# Patient Record
Sex: FEMALE | Race: WHITE | NOT HISPANIC OR LATINO | Employment: OTHER | URBAN - METROPOLITAN AREA
[De-identification: names, ages, dates, MRNs, and addresses within clinical notes are randomized per-mention and may not be internally consistent; named-entity substitution may affect disease eponyms.]

---

## 2017-08-28 ENCOUNTER — TRANSCRIBE ORDERS (OUTPATIENT)
Dept: ADMINISTRATIVE | Facility: HOSPITAL | Age: 77
End: 2017-08-28

## 2017-08-28 DIAGNOSIS — Z12.31 SCREENING MAMMOGRAM, ENCOUNTER FOR: Primary | ICD-10-CM

## 2017-09-29 ENCOUNTER — HOSPITAL ENCOUNTER (OUTPATIENT)
Dept: MAMMOGRAPHY | Facility: HOSPITAL | Age: 77
Discharge: HOME | End: 2017-09-29
Attending: INTERNAL MEDICINE | Admitting: INTERNAL MEDICINE

## 2017-09-29 DIAGNOSIS — Z12.31 SCREENING MAMMOGRAM, ENCOUNTER FOR: ICD-10-CM

## 2017-09-29 PROCEDURE — G0202 SCR MAMMO BI INCL CAD: HCPCS

## 2017-10-02 ENCOUNTER — OFFICE VISIT (OUTPATIENT)
Dept: SURGERY | Facility: CLINIC | Age: 77
End: 2017-10-02

## 2017-10-02 ENCOUNTER — TELEPHONE (OUTPATIENT)
Dept: SURGERY | Facility: CLINIC | Age: 77
End: 2017-10-02

## 2017-10-02 VITALS — DIASTOLIC BLOOD PRESSURE: 82 MMHG | SYSTOLIC BLOOD PRESSURE: 128 MMHG | HEIGHT: 64 IN

## 2017-10-02 DIAGNOSIS — Z09 FOLLOW UP: Primary | ICD-10-CM

## 2017-10-02 DIAGNOSIS — Z17.0 MALIGNANT NEOPLASM OF CENTRAL PORTION OF RIGHT BREAST IN FEMALE, ESTROGEN RECEPTOR POSITIVE (HCC): ICD-10-CM

## 2017-10-02 DIAGNOSIS — C50.111 MALIGNANT NEOPLASM OF CENTRAL PORTION OF RIGHT BREAST IN FEMALE, ESTROGEN RECEPTOR POSITIVE (HCC): ICD-10-CM

## 2017-10-02 PROCEDURE — 99213 OFFICE O/P EST LOW 20 MIN: CPT | Performed by: SURGERY

## 2017-10-02 NOTE — PROGRESS NOTES
PATIENT INFORMATION  Anuja Echols   - 1940    CHIEF COMPLAINT  Chief Complaint   Patient presents with   • Follow-up   F/u mammo, pt is w/o complaints  1 year/annual mammogram left breast  Stage IIB ([id number]) strongly ER +, weakly LA +, HER-2 negative, invasive ductal carcinoma of the right breast.                         * status post mastectomy with axillary lymph node dissection              * On Arimidex    HISTORY OF PRESENT ILLNESS  HPI    Anuja presents to the office today for her 1 year/annual visit.  She is unable to provide any history.  Records of the nursing home reviewed.  Per review of records and discussion with his nurse that have been no complaints of any left breast masses, skin changes, nipple discharge or axillary masses.  She did not follow-up with Dr. Nicole Montesinos.  I did discuss this with the nursing staff.  Medications, medical and surgical history reviewed.  Nursing home records reviewed.  Mammogram left breast reviewed  - matured study as patient wheelchair bound.  BI-RADS Category 2.    REVIEW OF SYSTEMS  Review of Systems  Unobtainable    ACTIVE PROBLEMS  Patient Active Problem List    Diagnosis   • Malignant neoplasm of central portion of right female breast [C50.111]     Overview Note:     Description: Right     • Dementia [F03.90]   • Seizure disorder [G40.909]         PAST MEDICAL HISTORY  Past Medical History:   Diagnosis Date   • Brain damage     Anoxic   • Breast cancer 2015    Stage II, [id number], invasive ductal, right, ER/LA positive, HER-2/denia negative.   • Dementia    • Disease of thyroid gland     Hypothyroid   • Seizures    • Wheelchair bound     Possibly secondary to polio as child.         SURGICAL HISTORY  Past Surgical History:   Procedure Laterality Date   • BREAST BIOPSY     • MASTECTOMY Right     Modified radical w/axilary dissection.         FAMILY HISTORY  Family History   Problem Relation Age of Onset   • Breast cancer Neg Hx          SOCIAL  HISTORY  Social History     Occupational History   •  Unemployed     Social History Main Topics   • Smoking status: Never Smoker   • Smokeless tobacco: Not on file   • Alcohol use No   • Drug use: Not on file   • Sexual activity: Not on file         CURRENT MEDICATIONS    Current Outpatient Prescriptions:   •  acetaminophen (TYLENOL) 500 MG tablet, Take 500 mg by mouth every 8 (eight) hours as needed for mild pain (1-3)., Disp: , Rfl:   •  anastrozole (ARIMIDEX) 1 MG chemo tablet, , Disp: , Rfl:   •  calcium-vitamin D (OYST-ITZEL/VITAMIN D) 500-200 MG-UNIT per tablet, Take 1 tablet by mouth 2 (two) times a day., Disp: , Rfl:   •  Dextromethorphan-Guaifenesin (ROBAFEN DM PO), Take 10 mL by mouth., Disp: , Rfl:   •  docusate (COLACE) 50 MG/5ML liquid, Take 50 mg by mouth 2 (two) times a day., Disp: , Rfl:   •  donepezil (ARICEPT) 10 MG tablet, Take  by mouth., Disp: , Rfl:   •  gabapentin (NEURONTIN) 300 MG capsule, Take  by mouth 2 (two) times a day., Disp: , Rfl:   •  ipratropium-albuterol (DUO-NEB) 0.5-2.5 mg/mL nebulizer, Ipratropium-Albuterol 0.5-2.5 (3) MG/3ML Inhalation Solution; Patient Sig: Ipratropium-Albuterol 0.5-2.5 (3) MG/3ML Inhalation Solution ; 90; 0; 16-Sep-2015; Active, Disp: , Rfl:   •  levETIRAcetam (KEPPRA) 100 MG/ML solution, Take  by mouth 2 (two) times a day., Disp: , Rfl:   •  levothyroxine (SYNTHROID, LEVOTHROID) 75 MCG tablet, Take  by mouth., Disp: , Rfl:   •  LORazepam (ATIVAN) 0.5 MG tablet, Take  by mouth., Disp: , Rfl:   •  LORazepam (ATIVAN) 2 MG/ML injection, LORazepam 2 MG/ML Injection Solution; Patient Sig: LORazepam 2 MG/ML Injection Solution ; 2; 0; 07-Oct-2015; Active, Disp: , Rfl:   •  MAGNESIUM HYDROXIDE PO, Take 30 mL by mouth every night., Disp: , Rfl:   •  Nutritional Supplements (PROMOD) liquid, Take 30 mL by mouth., Disp: , Rfl:   •  nystatin (MYCOSTATIN) [id number] UNIT/GM cream, Apply  topically., Disp: , Rfl:   •  oxyCODONE-acetaminophen (PERCOCET) 5-325 MG per tablet, Take  "1 tablet by mouth every 8 (eight) hours as needed., Disp: , Rfl:   •  oyster shell calcium 500 MG tablet tablet, Take  by mouth 2 (two) times a day., Disp: , Rfl:   •  Unable to find, 1 each as needed. Calazyme cream Apply to buttocks and ramón area as needed DX: skin disorder nos, Disp: , Rfl:   •  VIMPAT 200 MG tablet, 2 (two) times a day., Disp: , Rfl:     ALLERGIES  Lactulose; Megestrol; and Nitrofurantoin    VITALS  Vitals:    10/02/17 1115   BP: 128/82   Height: 64\" (162.6 cm)       LAST RESULTS   Hospital Outpatient Visit on 02/16/2016   Component Date Value Ref Range Status   • WBC 02/16/2016 9.23  4.80 - 10.80 K/Cumm Final   • RBC 02/16/2016 4.81  4.20 - 5.40 Million Final   • Hemoglobin 02/16/2016 14.7  12.0 - 16.0 g/dL Final   • Hematocrit 02/16/2016 45.3  37.0 - 47.0 % Final   • MCV 02/16/2016 94.2  81.0 - 99.0 fL Final   • MCH 02/16/2016 30.6  27.0 - 31.0 pg Final   • MCHC 02/16/2016 32.5  31.0 - 37.0 g/dL Final   • RDW-CV 02/16/2016 13.2  11.5 - 14.5 % Final   • Platelets 02/16/2016 300  140 - 500 K/Cumm Final   • MPV 02/16/2016 11.0* 7.4 - 10.4 fL Final   • Neutrophil Rel % 02/16/2016 51  45 - 70 % Final   • Lymphocyte Rel % 02/16/2016 33  20 - 45 % Final   • Monocyte Rel % 02/16/2016 13* 3 - 8 % Final   • Eosinophil Rel % 02/16/2016 2  0 - 4 % Final   • Basophil Rel % 02/16/2016 1  0 - 2 % Final   • Neutrophils Absolute 02/16/2016 4.67  1.50 - 8.30 K/Cumm Final   • Lymphocytes Absolute 02/16/2016 3.03  0.60 - 4.80 K/Cumm Final   • Monocytes Absolute 02/16/2016 1.19* 0.00 - 1.00 K/Cumm Final   • Eosinophils Absolute 02/16/2016 0.19  0.10 - 0.30 K/Cumm Final   • Basophils Absolute 02/16/2016 0.09  0.00 - 0.20 K/Cumm Final   • Immature Granulocyte Rel % 02/16/2016 0.1  0.0 - 0.6 % Final     Mammo Screening Modified Left With Cad    Result Date: 9/29/2017  Narrative: LEFT UNILATERAL ANNUAL SCREENING MAMMOGRAM, 09/29/2017:  HISTORY: Right mastectomy for breast cancer in 2015.  TECHNIQUE: Left unilateral " digital diagnostic mammogram images were obtained and reviewed with CAD. The examination is significantly limited by restricted positioning as the patient is confined to wheelchair, has limited range of shoulder motion and suffers involuntary tremors.  COMPARISON: *  Mammogram 09/27/2016.  FINDINGS: The breasts are heterogeneously dense, which may obscure small masses. Stable diffuse mildly nodular parenchymal pattern and scattered benign-appearing calcifications. No significant change when compared with prior images. No mammographic evidence of malignancy. Recommend repeat mammogram in one year.      Impression: 1. Benign left unilateral screening mammogram. 2. Recommend repeat mammogram in one year.  BI-RADS CATEGORY 2: Benign Findings.  Women over the age of 40 undergoing screening mammography are entered into a reminder system with target due date for the next mammogram.  This report was finalized on 9/29/2017 3:18 PM by Dr. Henri Dickson MD.        PHYSICAL EXAM  Physical Exam   Constitutional: She is oriented to person, place, and time.   Cardiovascular: Normal rate, regular rhythm and normal heart sounds.    Pulmonary/Chest: Breath sounds normal. Left breast exhibits no inverted nipple, no mass, no nipple discharge, no skin change and no tenderness.       Abdominal: Soft. Bowel sounds are normal.   Lymphadenopathy:     She has no cervical adenopathy.   Neurological: She is alert and oriented to person, place, and time.   Skin: Skin is warm and dry.   Nursing note and vitals reviewed.      ASSESSMENT  Stage II invasive ductal carcinoma right breast - ER/HI positive, HER-2 negative  Status post modified radical mastectomy  On adjuvant hormonal/antiestrogen therapy-Arimidex    Most recent mammogram BI-RADS Category 2      PLAN  Will need her 1 year/annual surveillance left breast mammogram-October 2018  Will follow-up in 1 year after mammogram.  Nursing home staff was advised to contact Dr. Nicole Montesinos's  office to set up her follow-up appointment with medical oncology.  Nursing home staff was advised to call the office sooner should she have worsening symptoms or go to the nearest emergency room.

## 2017-10-02 NOTE — TELEPHONE ENCOUNTER
Please contact nursing home staff and inform them that they need to make patient's follow-up visit with Dr. Nicole Montesinos.

## 2017-10-16 ENCOUNTER — OFFICE VISIT (OUTPATIENT)
Dept: ONCOLOGY | Facility: CLINIC | Age: 77
End: 2017-10-16
Attending: INTERNAL MEDICINE

## 2017-10-16 ENCOUNTER — LAB (OUTPATIENT)
Dept: LAB | Facility: HOSPITAL | Age: 77
End: 2017-10-16
Attending: INTERNAL MEDICINE

## 2017-10-16 VITALS
RESPIRATION RATE: 14 BRPM | DIASTOLIC BLOOD PRESSURE: 82 MMHG | OXYGEN SATURATION: 97 % | TEMPERATURE: 98.1 F | SYSTOLIC BLOOD PRESSURE: 148 MMHG | HEART RATE: 86 BPM

## 2017-10-16 DIAGNOSIS — C50.111 MALIGNANT NEOPLASM OF CENTRAL PORTION OF RIGHT BREAST IN FEMALE, ESTROGEN RECEPTOR POSITIVE (HCC): Primary | ICD-10-CM

## 2017-10-16 DIAGNOSIS — Z17.0 MALIGNANT NEOPLASM OF CENTRAL PORTION OF RIGHT BREAST IN FEMALE, ESTROGEN RECEPTOR POSITIVE (HCC): Primary | ICD-10-CM

## 2017-10-16 LAB
BASOPHILS # BLD AUTO: 0.08 10*3/MM3 (ref 0–0.1)
BASOPHILS NFR BLD AUTO: 0.9 % (ref 0–1.1)
DEPRECATED RDW RBC AUTO: 47.6 FL (ref 37–49)
EOSINOPHIL # BLD AUTO: 0.33 10*3/MM3 (ref 0–0.36)
EOSINOPHIL NFR BLD AUTO: 3.9 % (ref 1–5)
ERYTHROCYTE [DISTWIDTH] IN BLOOD BY AUTOMATED COUNT: 13.2 % (ref 11.7–14.5)
HCT VFR BLD AUTO: 44.9 % (ref 34–45)
HGB BLD-MCNC: 14.4 G/DL (ref 11.5–14.9)
IMM GRANULOCYTES # BLD: 0.06 10*3/MM3 (ref 0–0.03)
IMM GRANULOCYTES NFR BLD: 0.7 % (ref 0–0.5)
LYMPHOCYTES # BLD AUTO: 3.17 10*3/MM3 (ref 1–3.5)
LYMPHOCYTES NFR BLD AUTO: 37.2 % (ref 20–49)
MCH RBC QN AUTO: 31 PG (ref 27–33)
MCHC RBC AUTO-ENTMCNC: 32.1 G/DL (ref 32–35)
MCV RBC AUTO: 96.6 FL (ref 83–97)
MONOCYTES # BLD AUTO: 0.9 10*3/MM3 (ref 0.25–0.8)
MONOCYTES NFR BLD AUTO: 10.6 % (ref 4–12)
NEUTROPHILS # BLD AUTO: 3.99 10*3/MM3 (ref 1.5–7)
NEUTROPHILS NFR BLD AUTO: 46.7 % (ref 39–75)
NRBC BLD MANUAL-RTO: 0 /100 WBC (ref 0–0)
PLATELET # BLD AUTO: 245 10*3/MM3 (ref 150–375)
PMV BLD AUTO: 10.6 FL (ref 8.9–12.1)
RBC # BLD AUTO: 4.65 10*6/MM3 (ref 3.9–5)
WBC NRBC COR # BLD: 8.53 10*3/MM3 (ref 4–10)

## 2017-10-16 PROCEDURE — 36416 COLLJ CAPILLARY BLOOD SPEC: CPT | Performed by: INTERNAL MEDICINE

## 2017-10-16 PROCEDURE — 85025 COMPLETE CBC W/AUTO DIFF WBC: CPT | Performed by: INTERNAL MEDICINE

## 2017-10-16 PROCEDURE — 99213 OFFICE O/P EST LOW 20 MIN: CPT | Performed by: INTERNAL MEDICINE

## 2017-10-16 RX ORDER — CLOTRIMAZOLE AND BETAMETHASONE DIPROPIONATE 10; .64 MG/G; MG/G
CREAM TOPICAL
Status: ON HOLD | COMMUNITY
Start: 2017-10-13 | End: 2021-03-24

## 2017-10-16 NOTE — PROGRESS NOTES
Subjective:     Reason for follow up:   1. Stage II, T2 N1a M0 invasive ductal carcinoma of the right central breast, ER positive, GA positive, HER-2/denia negative   * Status post modified radical mastectomy with axillary dissection.    * Anastrozole initiated on 12/29/2015.      History of Present Ilness: Anuja Echols presents for follow-up of breast cancer. She remains on Arimidex and tolerates it well.  She had a mammogram performed in late September that was benign.  Her caretaker that with her denies any significant issues.    Past Medical   Past Medical History:   Diagnosis Date   • Brain damage     Anoxic   • Breast cancer 12/2015    Stage II, P1R1wG2, invasive ductal, right, ER/GA positive, HER-2/denia negative.   • Dementia    • Depression    • Disease of thyroid gland     Hypothyroid   • H/O Diaphragmatic hernia without obstruction or gangrene    • Mild intellectual disability    • Osteoporosis    • Seizures    • Wheelchair bound     Possibly secondary to polio as child.     Patient Active Problem List   Diagnosis   • Malignant neoplasm of central portion of right female breast   • Dementia   • Seizure disorder     Social History   Social History     Social History   • Marital status:      Spouse name: N/A   • Number of children: N/A   • Years of education: N/A     Occupational History   •  Unemployed     Social History Main Topics   • Smoking status: Never Smoker   • Smokeless tobacco: Never Used   • Alcohol use No   • Drug use: Not on file   • Sexual activity: Not on file     Other Topics Concern   • Not on file     Social History Narrative      Family History  Family History   Problem Relation Age of Onset   • Breast cancer Neg Hx      Allergies  Allergies   Allergen Reactions   • Lactulose    • Megestrol    • Nitrofurantoin        Medications: The current medication list was reviewed in the EMR.    Review of Systems  Unobtainable secondary to nonverbal status     Objective:     Vitals:     10/16/17 1518   BP: 148/82   Pulse: 86   Resp: 14   Temp: 98.1 °F (36.7 °C)   TempSrc: Oral   SpO2: 97%   Weight: Comment: pt unable to stand for a weight   Height: Comment: pt unable to stand for a height   PainSc: 0-No pain     Current Status 10/16/2017   ECOG score 4     GENERAL:elderly female nonverbal in wheelchair  SKIN:  Without rashes, purpura or petechiae.   HEAD:  Normocephalic.  EYES:  EOMs intact.  Conjunctivae normal.  EARS:  Hearing intact.  LYMPHATICS:  No cervical, supraclavicular, axillary adenopathy.  CHEST:  Lungs clear to percussion and auscultation. Good airflow. Right chest wall status post mastectomy without palpable masses or skin changes         Labs and Imaging  Lab Results   Component Value Date    WBC 8.53 10/16/2017    HGB 14.4 10/16/2017    HCT 44.9 10/16/2017    MCV 96.6 10/16/2017     10/16/2017       Breast imaging: mammogram:  IMPRESSION:  1. Benign left unilateral screening mammogram.  2. Recommend repeat mammogram in one year.      BI-RADS CATEGORY 2: Benign Findings.    Assessment/Plan     Assessment:   1. Stage IIB (Q7X5jU3) strongly ER +, weakly IA +, HER-2 negative, invasive ductal carcinoma of the right breast.    * status post mastectomy with axillary lymph node dissection    * The patient was felt not to be a candidate for adjuvant chemotherapy.    * Anastrozole 1 mg daily was initiated on 12/29/2015 and she tolerates this well. We have not tested a dexa because she's nonambulatory and it would be difficult to obtain.   2. Dementia with anoxic brain injury: The patient is essentially nonverbal.     Plan:     1. Continue anastrazole.  2. Annual mammogram due next October  3. Patient will continue calcium and vitamin D supplementation.      Follow-up in 12 months. I asked the patient to call for any questions, concerns, or new symptoms.

## 2018-08-23 ENCOUNTER — TRANSCRIBE ORDERS (OUTPATIENT)
Dept: ADMINISTRATIVE | Facility: HOSPITAL | Age: 78
End: 2018-08-23

## 2018-08-23 DIAGNOSIS — Z12.39 SCREENING BREAST EXAMINATION: Primary | ICD-10-CM

## 2018-10-02 ENCOUNTER — HOSPITAL ENCOUNTER (OUTPATIENT)
Dept: MAMMOGRAPHY | Facility: HOSPITAL | Age: 78
Discharge: HOME | End: 2018-10-02
Attending: INTERNAL MEDICINE | Admitting: INTERNAL MEDICINE

## 2018-10-02 DIAGNOSIS — Z12.39 SCREENING BREAST EXAMINATION: ICD-10-CM

## 2018-10-02 PROCEDURE — 77063 BREAST TOMOSYNTHESIS BI: CPT

## 2018-10-02 PROCEDURE — 77067 SCR MAMMO BI INCL CAD: CPT

## 2018-10-10 ENCOUNTER — OFFICE VISIT (OUTPATIENT)
Dept: SURGERY | Facility: CLINIC | Age: 78
End: 2018-10-10

## 2018-10-10 VITALS — SYSTOLIC BLOOD PRESSURE: 102 MMHG | DIASTOLIC BLOOD PRESSURE: 62 MMHG

## 2018-10-10 DIAGNOSIS — Z17.0 MALIGNANT NEOPLASM OF CENTRAL PORTION OF RIGHT BREAST IN FEMALE, ESTROGEN RECEPTOR POSITIVE (HCC): Primary | ICD-10-CM

## 2018-10-10 DIAGNOSIS — C50.111 MALIGNANT NEOPLASM OF CENTRAL PORTION OF RIGHT BREAST IN FEMALE, ESTROGEN RECEPTOR POSITIVE (HCC): Primary | ICD-10-CM

## 2018-10-10 PROCEDURE — 99212 OFFICE O/P EST SF 10 MIN: CPT | Performed by: SURGERY

## 2018-10-10 NOTE — PROGRESS NOTES
1 yr f/u breast CA, R mastectomy 2015, mammo done, no complaints  She is status post right mastectomy in 2015.  She responds to questions when asked.  He is confined to a wheelchair.  She had a recent mammogram.  Her lungs are clear and equal or heart shows regular rate and rhythm her liver was nonpalpable or abdominal exam was benign she has small reducible asymptomatic umbilical hernia.  Right chest wall showed no masses her left breast showed no masses or skin changes.  There was no supraclavicular nor axillary adenopathy.  Her unilateral mammogram was benign with no malignant findings.  I would recommend observation only on her umbilical hernia and a repeat mammogram in one year with reexamine at that time.

## 2019-03-14 ENCOUNTER — TRANSCRIBE ORDERS (OUTPATIENT)
Dept: FAMILY MEDICINE CLINIC | Facility: CLINIC | Age: 79
End: 2019-03-14

## 2019-03-14 ENCOUNTER — HOSPITAL ENCOUNTER (EMERGENCY)
Facility: HOSPITAL | Age: 79
Discharge: HOME | End: 2019-03-14
Attending: EMERGENCY MEDICINE | Admitting: EMERGENCY MEDICINE

## 2019-03-14 ENCOUNTER — APPOINTMENT (OUTPATIENT)
Dept: GENERAL RADIOLOGY | Facility: HOSPITAL | Age: 79
End: 2019-03-14

## 2019-03-14 VITALS
TEMPERATURE: 98.5 F | HEART RATE: 73 BPM | DIASTOLIC BLOOD PRESSURE: 47 MMHG | SYSTOLIC BLOOD PRESSURE: 114 MMHG | OXYGEN SATURATION: 93 % | RESPIRATION RATE: 28 BRPM | WEIGHT: 140 LBS | BODY MASS INDEX: 23.9 KG/M2 | HEIGHT: 64 IN

## 2019-03-14 DIAGNOSIS — R13.10 DYSPHAGIA, UNSPECIFIED TYPE: ICD-10-CM

## 2019-03-14 DIAGNOSIS — J18.9 PNEUMONIA OF BOTH LOWER LOBES DUE TO INFECTIOUS ORGANISM: Primary | ICD-10-CM

## 2019-03-14 DIAGNOSIS — R05.3 CHRONIC COUGH: Primary | ICD-10-CM

## 2019-03-14 LAB
ALBUMIN SERPL-MCNC: 2.4 G/DL (ref 3.5–5.2)
ALBUMIN/GLOB SERPL: 0.4 G/DL
ALP SERPL-CCNC: 77 U/L (ref 39–117)
ALT SERPL W P-5'-P-CCNC: 10 U/L (ref 1–33)
ANION GAP SERPL CALCULATED.3IONS-SCNC: 10.3 MMOL/L
AST SERPL-CCNC: 14 U/L (ref 1–32)
BASOPHILS # BLD AUTO: 0.05 10*3/MM3 (ref 0–0.2)
BASOPHILS NFR BLD AUTO: 0.5 % (ref 0–1.5)
BILIRUB SERPL-MCNC: 0.5 MG/DL (ref 0.1–1.2)
BUN BLD-MCNC: 12 MG/DL (ref 8–23)
BUN/CREAT SERPL: 17.4 (ref 7–25)
CALCIUM SPEC-SCNC: 8.8 MG/DL (ref 8.6–10.5)
CHLORIDE SERPL-SCNC: 99 MMOL/L (ref 98–107)
CO2 SERPL-SCNC: 27.7 MMOL/L (ref 22–29)
CREAT BLD-MCNC: 0.69 MG/DL (ref 0.57–1)
DEPRECATED RDW RBC AUTO: 50 FL (ref 37–54)
EOSINOPHIL # BLD AUTO: 0.1 10*3/MM3 (ref 0–0.4)
EOSINOPHIL NFR BLD AUTO: 1 % (ref 0.3–6.2)
ERYTHROCYTE [DISTWIDTH] IN BLOOD BY AUTOMATED COUNT: 13.7 % (ref 12.3–15.4)
GFR SERPL CREATININE-BSD FRML MDRD: 82 ML/MIN/1.73
GLOBULIN UR ELPH-MCNC: 5.4 GM/DL
GLUCOSE BLD-MCNC: 139 MG/DL (ref 65–99)
HCT VFR BLD AUTO: 40.8 % (ref 34–46.6)
HGB BLD-MCNC: 12.7 G/DL (ref 12–15.9)
IMM GRANULOCYTES # BLD AUTO: 0.12 10*3/MM3 (ref 0–0.05)
IMM GRANULOCYTES NFR BLD AUTO: 1.2 % (ref 0–0.5)
LYMPHOCYTES # BLD AUTO: 2.05 10*3/MM3 (ref 0.7–3.1)
LYMPHOCYTES NFR BLD AUTO: 20.6 % (ref 19.6–45.3)
MCH RBC QN AUTO: 30.9 PG (ref 26.6–33)
MCHC RBC AUTO-ENTMCNC: 31.1 G/DL (ref 31.5–35.7)
MCV RBC AUTO: 99.3 FL (ref 79–97)
MONOCYTES # BLD AUTO: 0.64 10*3/MM3 (ref 0.1–0.9)
MONOCYTES NFR BLD AUTO: 6.4 % (ref 5–12)
NEUTROPHILS # BLD AUTO: 6.97 10*3/MM3 (ref 1.4–7)
NEUTROPHILS NFR BLD AUTO: 70.3 % (ref 42.7–76)
NRBC BLD AUTO-RTO: 0 /100 WBC (ref 0–0)
NT-PROBNP SERPL-MCNC: 299.3 PG/ML (ref 0–1800)
PLATELET # BLD AUTO: 303 10*3/MM3 (ref 140–450)
PMV BLD AUTO: 10.6 FL (ref 6–12)
POTASSIUM BLD-SCNC: 4.6 MMOL/L (ref 3.5–5.2)
PROT SERPL-MCNC: 7.8 G/DL (ref 6–8.5)
RBC # BLD AUTO: 4.11 10*6/MM3 (ref 3.77–5.28)
SODIUM BLD-SCNC: 137 MMOL/L (ref 136–145)
TROPONIN T SERPL-MCNC: <0.01 NG/ML (ref 0–0.03)
WBC NRBC COR # BLD: 9.93 10*3/MM3 (ref 3.4–10.8)

## 2019-03-14 PROCEDURE — 80053 COMPREHEN METABOLIC PANEL: CPT | Performed by: EMERGENCY MEDICINE

## 2019-03-14 PROCEDURE — 85025 COMPLETE CBC W/AUTO DIFF WBC: CPT | Performed by: EMERGENCY MEDICINE

## 2019-03-14 PROCEDURE — 84484 ASSAY OF TROPONIN QUANT: CPT | Performed by: EMERGENCY MEDICINE

## 2019-03-14 PROCEDURE — 99283 EMERGENCY DEPT VISIT LOW MDM: CPT | Performed by: EMERGENCY MEDICINE

## 2019-03-14 PROCEDURE — 96365 THER/PROPH/DIAG IV INF INIT: CPT

## 2019-03-14 PROCEDURE — 94640 AIRWAY INHALATION TREATMENT: CPT

## 2019-03-14 PROCEDURE — 93010 ELECTROCARDIOGRAM REPORT: CPT | Performed by: INTERNAL MEDICINE

## 2019-03-14 PROCEDURE — 71045 X-RAY EXAM CHEST 1 VIEW: CPT

## 2019-03-14 PROCEDURE — 25010000002 CEFTRIAXONE SODIUM-DEXTROSE 1-3.74 GM-%(50ML) RECONSTITUTED SOLUTION: Performed by: EMERGENCY MEDICINE

## 2019-03-14 PROCEDURE — 94799 UNLISTED PULMONARY SVC/PX: CPT

## 2019-03-14 PROCEDURE — 93005 ELECTROCARDIOGRAM TRACING: CPT | Performed by: EMERGENCY MEDICINE

## 2019-03-14 PROCEDURE — 99284 EMERGENCY DEPT VISIT MOD MDM: CPT

## 2019-03-14 PROCEDURE — 83880 ASSAY OF NATRIURETIC PEPTIDE: CPT | Performed by: EMERGENCY MEDICINE

## 2019-03-14 RX ORDER — ALBUTEROL SULFATE 2.5 MG/3ML
2.5 SOLUTION RESPIRATORY (INHALATION)
Status: COMPLETED | OUTPATIENT
Start: 2019-03-14 | End: 2019-03-14

## 2019-03-14 RX ORDER — AZITHROMYCIN 250 MG/1
500 TABLET, FILM COATED ORAL ONCE
Status: COMPLETED | OUTPATIENT
Start: 2019-03-14 | End: 2019-03-14

## 2019-03-14 RX ORDER — AZITHROMYCIN 250 MG/1
TABLET, FILM COATED ORAL
Qty: 6 TABLET | Refills: 0 | Status: SHIPPED | OUTPATIENT
Start: 2019-03-15 | End: 2019-12-03

## 2019-03-14 RX ORDER — CEFTRIAXONE 1 G/50ML
1 INJECTION, SOLUTION INTRAVENOUS ONCE
Status: COMPLETED | OUTPATIENT
Start: 2019-03-14 | End: 2019-03-14

## 2019-03-14 RX ORDER — SODIUM CHLORIDE 0.9 % (FLUSH) 0.9 %
10 SYRINGE (ML) INJECTION AS NEEDED
Status: DISCONTINUED | OUTPATIENT
Start: 2019-03-14 | End: 2019-03-14 | Stop reason: HOSPADM

## 2019-03-14 RX ORDER — ACETAMINOPHEN 500 MG
1000 TABLET ORAL EVERY 8 HOURS PRN
Status: ON HOLD | COMMUNITY
End: 2021-03-24

## 2019-03-14 RX ADMIN — CEFTRIAXONE 1 G: 1 INJECTION, SOLUTION INTRAVENOUS at 14:44

## 2019-03-14 RX ADMIN — ALBUTEROL SULFATE 2.5 MG: 2.5 SOLUTION RESPIRATORY (INHALATION) at 12:30

## 2019-03-14 RX ADMIN — ALBUTEROL SULFATE 2.5 MG: 2.5 SOLUTION RESPIRATORY (INHALATION) at 11:44

## 2019-03-14 RX ADMIN — AZITHROMYCIN 500 MG: 250 TABLET, FILM COATED ORAL at 14:44

## 2019-03-14 NOTE — DISCHARGE INSTRUCTIONS
Medication as directed.  Recommend oxygen via nasal cannula as needed to keep sats above 90%.  Follow-up with PCP as above.  Return to ED for medical emergencies.

## 2019-03-14 NOTE — ED PROVIDER NOTES
Subjective   Anuja Echols is an 79 yo WF who presents secondary to shortness of breath.  Patient is a resident at a local nursing home.  Per nursing home report she has been evaluated for possible aspiration.  Patient's oxygen saturations dropped into the 70s today.  Patient obviously short of breath.  Sats in the 70s on room air on EMS arrival at nursing home.  EMS was called to the nursing home.  Patient was placed on oxygen in route.  She presents for evaluation.    Patient has a history of anoxic brain injury.  She provides no useful information.  She will occasionally respond with a one-word answer.  However she is not a reliable historian.        History provided by:  Nursing home and EMS personnel  History limited by: Anoxic brain injury.  Shortness of Breath   Severity:  Unable to specify  Onset quality:  Unable to specify  Timing:  Unable to specify  Progression:  Unable to specify  Context comment:  As described above.  Associated symptoms comment:  Patient cannot provide any details.  No associated symptoms per nursing home or EMS report      Review of Systems   Unable to perform ROS: Dementia   Respiratory: Positive for shortness of breath.        Past Medical History:   Diagnosis Date   • Brain damage     Anoxic   • Breast cancer (CMS/[facility]) 12/2015    Stage II, U4Y2xQ0, invasive ductal, right, ER/NV positive, HER-2/denia negative.   • Dementia    • Depression    • Disease of thyroid gland     Hypothyroid   • H/O Diaphragmatic hernia without obstruction or gangrene    • Mild intellectual disability    • Osteoporosis    • Seizures (CMS/[facility])    • Wheelchair bound     Possibly secondary to polio as child.       Allergies   Allergen Reactions   • Lactulose    • Megestrol    • Nitrofurantoin        Past Surgical History:   Procedure Laterality Date   • BREAST BIOPSY     • MASTECTOMY Right 2015    Modified radical w/axilary dissection.       Family History   Problem Relation Age of Onset   • Breast cancer Neg  Hx        Social History     Socioeconomic History   • Marital status:      Spouse name: Not on file   • Number of children: Not on file   • Years of education: Not on file   • Highest education level: Not on file   Occupational History     Employer: UNEMPLOYED   Tobacco Use   • Smoking status: Never Smoker   • Smokeless tobacco: Never Used   Substance and Sexual Activity   • Alcohol use: No   • Drug use: No           Objective   Physical Exam   Constitutional: She appears well-developed and well-nourished. No distress.   78-year-old white female laying in bed.  Patient appears in fair overall health.  Vital signs notable for respiratory rate of 32.  O2 sat of 96% on oxygen via nasal cannula.   HENT:   Head: Normocephalic and atraumatic.   Right Ear: External ear normal.   Left Ear: External ear normal.   Nose: Nose normal.   Mouth/Throat: Oropharynx is clear and moist.   Eyes: Conjunctivae and EOM are normal. Pupils are equal, round, and reactive to light.   Neck: Normal range of motion. Neck supple.   Cardiovascular: Normal rate, regular rhythm, normal heart sounds and intact distal pulses. Exam reveals no gallop and no friction rub.   No murmur heard.  Pulmonary/Chest: Tachypnea noted. She is in respiratory distress. She has no wheezes. She has rhonchi in the right lower field and the left lower field.   Upper airway congestion present.  Coarse breath sounds and rhonchi bilaterally   Abdominal: Soft. Bowel sounds are normal. She exhibits no distension. There is no tenderness.   Musculoskeletal: Normal range of motion.   Neurological: She is alert. She has normal reflexes.   Skin: Skin is warm and dry. She is not diaphoretic.   Psychiatric: She has a normal mood and affect. Her behavior is normal.   Nursing note and vitals reviewed.      ECG 12 Lead    Date/Time: 3/14/2019 11:43 AM  Performed by: Gm Alcazar MD  Authorized by: Gm Alcazar MD   Interpreted by physician  Comparison: not compared  with previous ECG   Rhythm: sinus rhythm  Rate: normal  QRS axis: normal  Conduction: conduction normal  ST Segments: ST segments normal  T Waves: T waves normal  Q waves: III  Clinical impression: normal ECG                 ED Course  ED Course as of Mar 15 0902   Thu Mar 14, 2019   1135 Patient does not provide any history.  Her PCP as scheduled her for a swallow study secondary to concerns over possible aspiration.  Patient has rhonchi bilaterally as well as upper airway congestion.  Will give breathing treatments.  Will obtain lab work, EKG and chest x-ray.  [SS]   1412 CBC unremarkable.  CMP only notable for blood sugar 139.  Troponin and proBNP are negative.  Chest x-ray shows evidence of retrocardial consolidation as well as bilateral basilar infiltrate.  I will treat with antibiotics.  Patient's aspiratory status improved with mini neb treatments.  Will treat with antibiotics.  Hospitalization is not required at this time.  [SS]      ED Course User Index  [SS] Gm Alcazar MD      Labs Reviewed   COMPREHENSIVE METABOLIC PANEL - Abnormal; Notable for the following components:       Result Value    Glucose 139 (*)     Albumin 2.40 (*)     All other components within normal limits    Narrative:     GFR Normal >60  Chronic Kidney Disease <60  Kidney Failure <15   CBC WITH AUTO DIFFERENTIAL - Abnormal; Notable for the following components:    MCV 99.3 (*)     MCHC 31.1 (*)     Immature Grans % 1.2 (*)     Immature Grans, Absolute 0.12 (*)     All other components within normal limits   BNP (IN-HOUSE) - Normal    Narrative:     Among patients with dyspnea, NT-proBNP is highly sensitive for the detection of acute congestive heart failure. In addition NT-proBNP of <300 pg/ml effectively rules out acute congestive heart failure with 99% negative predictive value.   TROPONIN (IN-HOUSE) - Normal    Narrative:     Troponin T Reference Range:  <= 0.03 ng/mL-   Negative for AMI  >0.03 ng/mL-     Abnormal for  myocardial necrosis.  Clinicians would have to utilize clinical acumen, EKG, Troponin and serial changes to determine if it is an Acute Myocardial Infarction or myocardial injury due to an underlying chronic condition.    CBC AND DIFFERENTIAL    Narrative:     The following orders were created for panel order CBC & Differential.  Procedure                               Abnormality         Status                     ---------                               -----------         ------                     CBC Auto Differential[[id number]]         Abnormal            Final result                 Please view results for these tests on the individual orders.       Xr Chest 1 View    Result Date: 3/14/2019  Narrative: FRONTAL CHEST 3/14/2019     HISTORY: Cough and shortness of air. Low O2 sats today  TECHNIQUE: Frontal chest was performed. COMPARISON 1/2/2013  FINDINGS: Status post right axillary surgery. Soft tissue artifact overlies the right lung apex. The patient is rotated to the right. Cardiac silhouette is stable. The aortic knob is ectatic but unchanged. Lung volumes are low with bronchovascular crowding. There is retrocardiac consolidation suspicious for pneumonia. Probable trace left effusion. Perihilar and lower lung zone opacities right greater than the left may reflect faint infiltrates or atelectasis. No pneumothorax. Degenerative change of the shoulders.      Impression: 1. Low lung volumes with retrocardiac consolidation suspicious for pneumonia. Probable trace to small left effusion. 2. Perihilar and lower lung zone opacities suggestive of atelectasis or infiltrates.  This report was finalized on 3/14/2019 12:18 PM by Dr. Scott Bentley MD.        My differential diagnosis for dyspnea includes but is not limited to:  Asthma, COPD, pneumonia, pulmonary embolus, acute respiratory distress syndrome, pneumothorax, pleural effusion, pulmonary fibrosis, congestive heart failure, myocardial infarction, DKA, uremia,  acidosis, sepsis, anemia, drug related, hyperventilation, CNS disease              MDM  Number of Diagnoses or Management Options  Pneumonia of both lower lobes due to infectious organism (CMS/HCC): new and requires workup     Amount and/or Complexity of Data Reviewed  Clinical lab tests: reviewed and ordered  Tests in the radiology section of CPT®: reviewed and ordered  Tests in the medicine section of CPT®: ordered and reviewed    Risk of Complications, Morbidity, and/or Mortality  Presenting problems: moderate  Diagnostic procedures: high  Management options: moderate    Patient Progress  Patient progress: stable        Final diagnoses:   Pneumonia of both lower lobes due to infectious organism (CMS/HCC)            Gm Alcazar MD  03/15/19 0902

## 2019-03-14 NOTE — ED NOTES
Patients meds given in apple sauce. Patient tolerated well.   Ruthie Munson RN  03/14/19 5714       Ruthie Munson RN  03/14/19 [id number]

## 2019-03-22 ENCOUNTER — APPOINTMENT (OUTPATIENT)
Dept: GENERAL RADIOLOGY | Facility: HOSPITAL | Age: 79
End: 2019-03-22

## 2019-03-25 ENCOUNTER — HOSPITAL ENCOUNTER (OUTPATIENT)
Dept: GENERAL RADIOLOGY | Facility: HOSPITAL | Age: 79
Discharge: HOME | End: 2019-03-25
Admitting: INTERNAL MEDICINE

## 2019-03-25 DIAGNOSIS — R13.10 DYSPHAGIA, UNSPECIFIED TYPE: ICD-10-CM

## 2019-03-25 DIAGNOSIS — R05.3 CHRONIC COUGH: ICD-10-CM

## 2019-03-25 PROCEDURE — 92611 MOTION FLUOROSCOPY/SWALLOW: CPT

## 2019-03-25 PROCEDURE — 74230 X-RAY XM SWLNG FUNCJ C+: CPT

## 2019-03-25 NOTE — MBS/VFSS/FEES
Outpatient Speech Language Pathology   Adult Swallow Modified Barium Swallow Study  LISA Richardson     Patient Name: Anuja Echols  : 1940  MRN: [id number]  Today's Date: 3/25/2019         Visit Date: 2019   Patient Active Problem List   Diagnosis   • Malignant neoplasm of central portion of right female breast (CMS/HCC)   • Dementia   • Seizures (CMS/HCC)   • Anoxic brain injury (CMS/HCC)        Past Medical History:   Diagnosis Date   • Brain damage     Anoxic   • Breast cancer (CMS/HCC) 2015    Stage II, H2R3jH1, invasive ductal, right, ER/CA positive, HER-2/denia negative.   • Dementia    • Depression    • Disease of thyroid gland     Hypothyroid   • H/O Diaphragmatic hernia without obstruction or gangrene    • Mild intellectual disability    • Osteoporosis    • Seizures (CMS/HCC)    • Wheelchair bound     Possibly secondary to polio as child.        Past Surgical History:   Procedure Laterality Date   • BREAST BIOPSY     • MASTECTOMY Right     Modified radical w/axilary dissection.         Visit Dx:     ICD-10-CM ICD-9-CM   1. Chronic cough R05 786.2   2. Dysphagia, unspecified type R13.10 787.20           SLP Adult Swallow Evaluation - 19 1128        Rehab Evaluation    Document Type  evaluation   -AD    Total Evaluation Minutes, SLP  40   -AD    Subjective Information  no complaints   -AD    Patient Observations  alert;cooperative;agree to therapy   -AD    Patient/Family Observations  Pt seen upright in SONJA at 90 degrees.    -AD    Patient Effort  good   -AD    Symptoms Noted During/After Treatment  none   -AD       General Information    Patient Profile Reviewed  yes   -AD    Pertinent History Of Current Problem  Pt is an 77 y/o resident of [facility]. MBS ordered due to suspected aspiration, dysphagia and chronic cough. Staff member reports she has had increasing difficulty swallowing with concerns for aspiration and considering Gtube for nutrition vs  allowing pt to eat with known risks. Currently on a puree diet with honey thick liquids. Hx of anoxic brain injury.   -AD    Current Method of Nutrition  pureed;honey-thick liquids   -AD    Precautions/Limitations, Vision  WFL;for purposes of eval   -AD    Precautions/Limitations, Hearing  WFL;for purposes of eval   -AD    Prior Level of Function-Communication  cognitive-linguistic impairment;expressive language impairment   -AD    Prior Level of Function-Swallowing  puree;honey thick liquids   -AD    Plans/Goals Discussed with  patient;other (see comments) staff member from [facility]   -AD    Barriers to Rehab  previous functional deficit;cognitive status   -AD    Patient's Goals for Discharge  patient did not state   -AD    Family Goals for Discharge  other (see comments);comfort diet vs feeding tube   -AD       Pain Assessment    Additional Documentation  -- no pain indicated   -AD       Oral Motor and Function    Oral Lesions or Structural Abnormalities and/or variants  none noted   -AD    Dentition Assessment  missing teeth;teeth are in poor condition   -AD    Secretion Management  other (see comments) excess secretions, able to spit some out   -AD    Mucosal Quality  moist, healthy   -AD    Volitional Swallow  delayed   -AD    Volitional Cough  unable to elicit;other (see comments) strong involuntary cough   -AD       Oral Musculature and Cranial Nerve Assessment    Oral Motor General Assessment  generalized oral motor weakness   -AD    Vocal Impairment, Detail. Cranial Nerve X (Vagus)  other (see comments) unable to assess; limited verbal/vocalizations   -AD       General Eating/Swallowing Observations    Respiratory Support Currently in Use  nasal cannula   -AD    Eating/Swallowing Skills  fed by SLP   -AD    Positioning During Eating  upright 90 degree;upright in chair   -AD       MBS/VFSS    Utensils Used  spoon;straw   -AD    Consistencies Trialed  regular textures;pureed;thin  liquids;nectar/syrup-thick liquids;honey-thick liquids pt unable to bite solids; not attempted   -AD       MBS/VFSS Interpretation    Oral Prep Phase  impaired oral phase of swallowing   -AD    Oral Transit Phase  impaired   -AD    Oral Residue  WFL   -AD    VFSS Summary  Pt presents with a moderate oropharyngeal dysphagia with decreased oral control and resulting in anterior loss, prolonged manipulation and premature spill. Pt with decreased hyolaryngeal movement, delay triggering of the pharyngeal swallow and decreased airway closure during the swallow w/penetration during and before on consecutive straw drinks from thins. No penetration of thins from spoon, nectar from spoon, honey thick and puree. Pt unable to bite the brittaney cracker for the exam therefore solids could not be assessed.    -AD    Oral Phase, Comment  Pt with decreased tongue strength and bolus prep causing increased transit time and premature spill. Weak back of tongue noted on all consistencies and trials.    -AD       Oral Preparatory Phase    Oral Preparatory Phase  reduced lip closure around utensil;prolonged manipulation;inadequate manipulation;anterior loss   -AD    Reduced Lip Closure Around Utensil  thin liquids;nectar-thick liquids;secondary to reduced labial seal;secondary to impaired cognitive status   -AD    Anterior Loss  thin liquids;nectar-thick liquids;secondary to reduced labial seal;secondary to impaired cognitive status   -AD    Prolonged Manipulation  nectar-thick liquids;honey-thick liquids;pudding/puree;secondary to reduced lingual strength   -AD    Inadequate Manipulation  thin liquids;nectar-thick liquids;honey-thick liquids;pudding/puree;secondary to reduced lingual strength   -AD    Oral Preparatory Phase, Comment  Pt with decreased tongue strength resulting in decreased bolus formation and control. Decreased closure around the spoon on thins and nectar resulting in anterior loss. Felt to be due to cognition in part due  to no issues with honey thick liquids or puree lip closure.    -AD       Oral Transit Phase    Impaired Oral Transit Phase  premature spillage of liquids into pharynx   -AD    Premature Spillage of Liquids into Pharynx  thin liquids;nectar-thick liquids;honey-thick liquids;secondary to reduced lingual control;other (see comments) decreased back of tongue movement   -AD    Oral Transit Phase, Comment  Pt with premature spill of all due to decreased tngue control and back of tongue weakness. Pt w/spill of honey to the rami of the mandible, nectar to the valleculae, thins to the valleculae and pyriforms.    -AD       Initiation of Pharyngeal Swallow    Initiation of Pharyngeal Swallow  bolus in valleculae;bolus in pyriform sinuses   -AD    Pharyngeal Phase  impaired pharyngeal phase of swallowing   -AD    Penetration Before the Swallow  thin liquids;secondary to reduced back of tongue control;secondary to delayed swallow initiation or mistiming   -AD    Penetration During the Swallow  thin liquids;secondary to delayed swallow initiation or mistiming;secondary to reduced laryngeal elevation;secondary to reduced vestibular closure   -AD    Response to Penetration  deep;no response;other (see comments) moderate depth above the vocal folds   -AD    Rosenbek's Scale  nectar:;honey:;pudding/puree:;1--->level 1;thin:;3--->level 3   -AD    Pharyngeal Residue  honey-thick liquids;valleculae;thin liquids;laryngeal vestibule;secondary to reduced hyolaryngeal excursion   -AD    Response to Residue  cleared residue with spontaneous subsequent swallow   -AD    Attempted Compensatory Maneuvers  bolus size   -AD    Response to Attempted Compensatory Maneuvers  prevented penetration   -AD    Pharyngeal Phase, Comment  Pt with noted delay of swallow resulting in premature spill and penetration of thins from straw. This is a result of decreased hyolaryngeal movement, decreased airway closure and delay in the triggering of the pharyngeal  swallow. Mild residue also noted on honey thick liquid trails w/spontaneous clearance. No aspiration was viewed and small spoon size bolus of thins prevented penetration.    -AD       Clinical Impression    SLP Swallowing Diagnosis  moderate;oral dysfunction;pharyngeal dysfunction   -AD    Functional Impact  risk of aspiration/pneumonia;risk of malnutrition   -AD    Rehab Potential/Prognosis, Swallowing  re-evaluate goals as necessary   -AD    Swallow Criteria for Skilled Therapeutic Interventions Met  demonstrates skilled criteria;other (see comments) if not already attempted and per [facility] staff SLP rec   -AD       Recommendations    Therapy Frequency (Swallow)  evaluation only   -AD    SLP Diet Recommendation  puree;honey thick liquids;other (see comments) consider trials of nectar if clinically indicated   -AD    Recommended Precautions and Strategies  upright posture during/after eating;small bites of food and sips of liquid;multiple swallows per sip of liquid   -AD    SLP Rec. for Method of Medication Administration  meds crushed;with pudding or applesauce;as tolerated   -AD    Monitor for Signs of Aspiration  no;none - silent aspiration present;other (see comments) silent penetration   -AD    Anticipated Dischage Disposition  extended care facility   -AD      User Key  (r) = Recorded By, (t) = Taken By, (c) = Cosigned By    Initials Name Provider Type    AD Jayla Herrera MS CCC-SLP Speech and Language Pathologist           OP SLP Education     Row Name 03/25/19 1128       Education    Barriers to Learning  Other (comment0 cognitive deficit  -AD    Action Taken to Address Barriers  Education provided to caregiver  -AD    Education Provided  Described results of evaluation;Family/caregivers expressed understanding of evaluation  -AD    Assessed  Learning needs;Learning motivation;Learning preferences;Learning readiness  -AD    Learning Motivation  Strong  -AD    Learning Method  Explanation  -AD     Teaching Response  Verbalized understanding  -AD      User Key  (r) = Recorded By, (t) = Taken By, (c) = Cosigned By    Initials Name Effective Dates    Jayla Jean Baptiste MS CCC-SLP 02/28/19 -           OP SLP Assessment/Plan - 03/25/19 1128        SLP Assessment    Functional Problems  Swallowing   -AD    Impact on Function: Swallowing  Risk of malnourishment;Risk of aspiration;Risk of pneumonia   -AD    Clinical Impression: Swallowing  Moderate:;oropharyngeal phase dysphagia   -AD    Functional Problems Comment  Decreased po intake and chronic, worsening cough   -AD    Clinical Impression Comments  Pt with recent pneumonia reported 3/14/19. Continues at present with congested with poor clearance of secretions.    -AD    SLP Diagnosis  Moderate oropharyngeal dysphagia   -AD    Prognosis  Fair (comment) due to prior deficit and cognitive status    due to prior deficit and cognitive status  -AD    Patient/caregiver participated in establishment of treatment plan and goals  Yes caregiver    caregiver  -AD       SLP Plan    Frequency  eval only   -AD    Planned CPT's?  SLP MBS: 76921   -AD      User Key  (r) = Recorded By, (t) = Taken By, (c) = Cosigned By    Initials Name Provider Type    Jayla Jean Baptiste MS CCC-SLP Speech and Language Pathologist             Time Calculation:   SLP Start Time: 1030  SLP Stop Time: 1128  SLP Time Calculation (min): 58 min  SLP Non-Billable Time (min): 18 min(transfer assistance)  Total Timed Code Minutes- SLP: 0 minute(s)    Therapy Charges for Today     Code Description Service Date Service Provider Modifiers Qty    [id number] HC ST MOTION FLUORO EVAL SWALLOW 3 3/25/2019 Jayla Herrera MS CCC-SLP GN 1          Jayla Herrera MS CCC-SLP  3/25/2019

## 2019-09-23 DIAGNOSIS — Z85.3 HX: BREAST CANCER: ICD-10-CM

## 2019-09-23 DIAGNOSIS — Z12.39 BREAST CANCER SCREENING, HIGH RISK PATIENT: Primary | ICD-10-CM

## 2019-10-03 ENCOUNTER — HOSPITAL ENCOUNTER (OUTPATIENT)
Dept: MAMMOGRAPHY | Facility: HOSPITAL | Age: 79
Discharge: HOME | End: 2019-10-03
Admitting: SURGERY

## 2019-10-03 DIAGNOSIS — Z85.3 HX: BREAST CANCER: ICD-10-CM

## 2019-10-03 DIAGNOSIS — Z12.39 BREAST CANCER SCREENING, HIGH RISK PATIENT: ICD-10-CM

## 2019-10-03 PROCEDURE — 77065 DX MAMMO INCL CAD UNI: CPT

## 2019-10-03 PROCEDURE — G0279 TOMOSYNTHESIS, MAMMO: HCPCS

## 2019-10-15 ENCOUNTER — OFFICE VISIT (OUTPATIENT)
Dept: SURGERY | Facility: CLINIC | Age: 79
End: 2019-10-15

## 2019-10-15 ENCOUNTER — TRANSCRIBE ORDERS (OUTPATIENT)
Dept: SURGERY | Facility: CLINIC | Age: 79
End: 2019-10-15

## 2019-10-15 VITALS
RESPIRATION RATE: 18 BRPM | DIASTOLIC BLOOD PRESSURE: 70 MMHG | SYSTOLIC BLOOD PRESSURE: 132 MMHG | BODY MASS INDEX: 23.9 KG/M2 | WEIGHT: 140 LBS | HEIGHT: 64 IN

## 2019-10-15 DIAGNOSIS — C50.111 MALIGNANT NEOPLASM OF CENTRAL PORTION OF RIGHT BREAST IN FEMALE, ESTROGEN RECEPTOR POSITIVE (HCC): Primary | ICD-10-CM

## 2019-10-15 DIAGNOSIS — Z17.0 MALIGNANT NEOPLASM OF CENTRAL PORTION OF RIGHT BREAST IN FEMALE, ESTROGEN RECEPTOR POSITIVE (HCC): Primary | ICD-10-CM

## 2019-10-15 DIAGNOSIS — Z12.31 SCREENING MAMMOGRAM FOR HIGH-RISK PATIENT: Primary | ICD-10-CM

## 2019-10-15 PROCEDURE — 99212 OFFICE O/P EST SF 10 MIN: CPT | Performed by: SURGERY

## 2019-12-03 ENCOUNTER — LAB (OUTPATIENT)
Dept: LAB | Facility: HOSPITAL | Age: 79
End: 2019-12-03

## 2019-12-03 ENCOUNTER — OFFICE VISIT (OUTPATIENT)
Dept: ONCOLOGY | Facility: CLINIC | Age: 79
End: 2019-12-03

## 2019-12-03 VITALS
BODY MASS INDEX: 24.2 KG/M2 | DIASTOLIC BLOOD PRESSURE: 80 MMHG | SYSTOLIC BLOOD PRESSURE: 148 MMHG | HEIGHT: 64 IN | HEART RATE: 75 BPM | RESPIRATION RATE: 12 BRPM | TEMPERATURE: 97.6 F | OXYGEN SATURATION: 98 %

## 2019-12-03 DIAGNOSIS — C50.111 MALIGNANT NEOPLASM OF CENTRAL PORTION OF RIGHT FEMALE BREAST, UNSPECIFIED ESTROGEN RECEPTOR STATUS (HCC): Primary | ICD-10-CM

## 2019-12-03 LAB
ALBUMIN SERPL-MCNC: 3.5 G/DL (ref 3.5–5.2)
ALBUMIN/GLOB SERPL: 0.8 G/DL
ALP SERPL-CCNC: 105 U/L (ref 39–117)
ALT SERPL W P-5'-P-CCNC: 14 U/L (ref 1–33)
ANION GAP SERPL CALCULATED.3IONS-SCNC: 15.6 MMOL/L (ref 5–15)
AST SERPL-CCNC: 15 U/L (ref 1–32)
BASOPHILS # BLD AUTO: 0.05 10*3/MM3 (ref 0–0.2)
BASOPHILS NFR BLD AUTO: 0.8 % (ref 0–1.5)
BILIRUB SERPL-MCNC: 0.2 MG/DL (ref 0.2–1.2)
BUN BLD-MCNC: 17 MG/DL (ref 8–23)
BUN/CREAT SERPL: 26.6 (ref 7–25)
CALCIUM SPEC-SCNC: 9.4 MG/DL (ref 8.6–10.5)
CHLORIDE SERPL-SCNC: 98 MMOL/L (ref 98–107)
CO2 SERPL-SCNC: 23.4 MMOL/L (ref 22–29)
CREAT BLD-MCNC: 0.64 MG/DL (ref 0.57–1)
DEPRECATED RDW RBC AUTO: 48.5 FL (ref 37–54)
EOSINOPHIL # BLD AUTO: 0.1 10*3/MM3 (ref 0–0.4)
EOSINOPHIL NFR BLD AUTO: 1.6 % (ref 0.3–6.2)
ERYTHROCYTE [DISTWIDTH] IN BLOOD BY AUTOMATED COUNT: 13.6 % (ref 12.3–15.4)
GFR SERPL CREATININE-BSD FRML MDRD: 89 ML/MIN/1.73
GLOBULIN UR ELPH-MCNC: 4.5 GM/DL
GLUCOSE BLD-MCNC: 206 MG/DL (ref 65–99)
HCT VFR BLD AUTO: 45.2 % (ref 34–46.6)
HGB BLD-MCNC: 14.5 G/DL (ref 12–15.9)
IMM GRANULOCYTES # BLD AUTO: 0.03 10*3/MM3 (ref 0–0.05)
IMM GRANULOCYTES NFR BLD AUTO: 0.5 % (ref 0–0.5)
LYMPHOCYTES # BLD AUTO: 1.59 10*3/MM3 (ref 0.7–3.1)
LYMPHOCYTES NFR BLD AUTO: 24.7 % (ref 19.6–45.3)
MCH RBC QN AUTO: 30.9 PG (ref 26.6–33)
MCHC RBC AUTO-ENTMCNC: 32.1 G/DL (ref 31.5–35.7)
MCV RBC AUTO: 96.4 FL (ref 79–97)
MONOCYTES # BLD AUTO: 0.67 10*3/MM3 (ref 0.1–0.9)
MONOCYTES NFR BLD AUTO: 10.4 % (ref 5–12)
NEUTROPHILS # BLD AUTO: 4.01 10*3/MM3 (ref 1.7–7)
NEUTROPHILS NFR BLD AUTO: 62 % (ref 42.7–76)
NRBC BLD AUTO-RTO: 0 /100 WBC (ref 0–0.2)
PLATELET # BLD AUTO: 288 10*3/MM3 (ref 140–450)
PMV BLD AUTO: 10.4 FL (ref 6–12)
POTASSIUM BLD-SCNC: 4.1 MMOL/L (ref 3.5–5.2)
PROT SERPL-MCNC: 8 G/DL (ref 6–8.5)
RBC # BLD AUTO: 4.69 10*6/MM3 (ref 3.77–5.28)
SODIUM BLD-SCNC: 137 MMOL/L (ref 136–145)
WBC NRBC COR # BLD: 6.45 10*3/MM3 (ref 3.4–10.8)

## 2019-12-03 PROCEDURE — 80053 COMPREHEN METABOLIC PANEL: CPT

## 2019-12-03 PROCEDURE — 85025 COMPLETE CBC W/AUTO DIFF WBC: CPT

## 2019-12-03 PROCEDURE — 99213 OFFICE O/P EST LOW 20 MIN: CPT | Performed by: INTERNAL MEDICINE

## 2019-12-03 PROCEDURE — 36415 COLL VENOUS BLD VENIPUNCTURE: CPT

## 2019-12-03 NOTE — PROGRESS NOTES
Subjective:     Reason for follow up:   1. Stage II, T2 N1a M0 invasive ductal carcinoma of the right central breast, ER positive, SC positive, HER-2/denia negative   * Status post modified radical mastectomy with axillary dissection.    * Anastrozole initiated on 12/29/2015.      History of Present Ilness: Anuja Echols presents for follow-up of breast cancer.  The patient lives in a nursing facility and is unable to provide much history.  Her caregiver reports no unusual weight loss.  The patient reports no pain.  She had a recent mammogram and breast-chest wall exam with Dr. Rjoas of surgery with no evidence of new primary or local recurrent disease.    Past Medical   Past Medical History:   Diagnosis Date   • Brain damage     Anoxic   • Breast cancer (CMS/HCC) 12/2015    Stage II, H1C8fO0, invasive ductal, right, ER/SC positive, HER-2/denia negative.   • Dementia (CMS/HCC)    • Depression    • Disease of thyroid gland     Hypothyroid   • H/O Diaphragmatic hernia without obstruction or gangrene    • Mild intellectual disability    • Osteoporosis    • Seizures (CMS/HCC)    • Wheelchair bound     Possibly secondary to polio as child.     Patient Active Problem List   Diagnosis   • Malignant neoplasm of central portion of right female breast (CMS/HCC)   • Dementia (CMS/HCC)   • Seizures (CMS/HCC)   • Anoxic brain injury (CMS/HCC)     Social History   Social History     Socioeconomic History   • Marital status:      Spouse name: Not on file   • Number of children: Not on file   • Years of education: Not on file   • Highest education level: Not on file   Occupational History     Employer: UNEMPLOYED   Tobacco Use   • Smoking status: Never Smoker   • Smokeless tobacco: Never Used   Substance and Sexual Activity   • Alcohol use: No   • Drug use: No      Family History  Family History   Problem Relation Age of Onset   • Breast cancer Neg Hx      Allergies  Allergies   Allergen Reactions   • Lactulose    • Megestrol   "  • Nitrofurantoin        Medications: The current medication list was reviewed in the EMR.    Review of Systems  Unobtainable secondary to what appears to be dementia     Objective:     Vitals:    12/03/19 1526   BP: 148/80   Pulse: 75   Resp: 12   Temp: 97.6 °F (36.4 °C)   SpO2: 98%   Weight: Comment: unable to get on scales   Height: 162 cm (63.78\")   PainSc: 0-No pain     Current Status 12/3/2019   ECOG score 2     GENERAL:elderly female nonverbal in wheelchair  SKIN:  Without rashes, purpura or petechiae.   HEAD:  Normocephalic.  EYES:  EOMs intact.  Conjunctivae normal.  EARS:  Hearing intact.  LYMPHATICS:  No cervical, supraclavicular, axillary adenopathy.  CHEST:  Lungs clear to percussion and auscultation. Good airflow.  MS: Contractures       Labs and Imaging  Lab Results   Component Value Date    WBC 6.45 12/03/2019    HGB 14.5 12/03/2019    HCT 45.2 12/03/2019    MCV 96.4 12/03/2019     12/03/2019       Left mammography 10/3/2019-  Assessment/Plan     Assessment:   1. Stage IIB ([id number]) strongly ER +, weakly MO +, HER-2 negative, invasive ductal carcinoma of the right breast.    * status post mastectomy with axillary lymph node dissection    * The patient was felt not to be a candidate for adjuvant chemotherapy.    * Anastrozole 1 mg daily was initiated on 12/29/2015 and she tolerates this well.  She continues to have no evidence of disease.  I would anticipate 5 years of antiestrogen therapy for her given the circumstances with an end date December 2020.         Plan:     1. Continue anastrazole.  2. Annual mammogram due next October  3. Patient will continue calcium and vitamin D supplementation.               "

## 2019-12-08 ENCOUNTER — LAB REQUISITION (OUTPATIENT)
Dept: LAB | Facility: HOSPITAL | Age: 79
End: 2019-12-08

## 2019-12-08 DIAGNOSIS — Z00.00 ROUTINE GENERAL MEDICAL EXAMINATION AT A HEALTH CARE FACILITY: ICD-10-CM

## 2019-12-08 LAB
ALBUMIN SERPL-MCNC: 3.2 G/DL (ref 3.5–5.2)
ALBUMIN/GLOB SERPL: 1 G/DL
ALP SERPL-CCNC: 78 U/L (ref 39–117)
ALT SERPL W P-5'-P-CCNC: 7 U/L (ref 1–33)
ANION GAP SERPL CALCULATED.3IONS-SCNC: 13.5 MMOL/L (ref 5–15)
AST SERPL-CCNC: 9 U/L (ref 1–32)
BASOPHILS # BLD AUTO: 0.06 10*3/MM3 (ref 0–0.2)
BASOPHILS NFR BLD AUTO: 0.8 % (ref 0–1.5)
BILIRUB SERPL-MCNC: 0.2 MG/DL (ref 0.2–1.2)
BUN BLD-MCNC: 22 MG/DL (ref 8–23)
BUN/CREAT SERPL: 36.7 (ref 7–25)
CALCIUM SPEC-SCNC: 8.9 MG/DL (ref 8.6–10.5)
CHLORIDE SERPL-SCNC: 105 MMOL/L (ref 98–107)
CO2 SERPL-SCNC: 26.5 MMOL/L (ref 22–29)
CREAT BLD-MCNC: 0.6 MG/DL (ref 0.57–1)
DEPRECATED RDW RBC AUTO: 44.1 FL (ref 37–54)
EOSINOPHIL # BLD AUTO: 0.18 10*3/MM3 (ref 0–0.4)
EOSINOPHIL NFR BLD AUTO: 2.5 % (ref 0.3–6.2)
ERYTHROCYTE [DISTWIDTH] IN BLOOD BY AUTOMATED COUNT: 13.1 % (ref 12.3–15.4)
GFR SERPL CREATININE-BSD FRML MDRD: 96 ML/MIN/1.73
GLOBULIN UR ELPH-MCNC: 3.2 GM/DL
GLUCOSE BLD-MCNC: 93 MG/DL (ref 65–99)
HCT VFR BLD AUTO: 38.7 % (ref 34–46.6)
HGB BLD-MCNC: 13 G/DL (ref 12–15.9)
IMM GRANULOCYTES # BLD AUTO: 0.04 10*3/MM3 (ref 0–0.05)
IMM GRANULOCYTES NFR BLD AUTO: 0.6 % (ref 0–0.5)
LYMPHOCYTES # BLD AUTO: 2.25 10*3/MM3 (ref 0.7–3.1)
LYMPHOCYTES NFR BLD AUTO: 31.7 % (ref 19.6–45.3)
MCH RBC QN AUTO: 31 PG (ref 26.6–33)
MCHC RBC AUTO-ENTMCNC: 33.6 G/DL (ref 31.5–35.7)
MCV RBC AUTO: 92.4 FL (ref 79–97)
MONOCYTES # BLD AUTO: 0.72 10*3/MM3 (ref 0.1–0.9)
MONOCYTES NFR BLD AUTO: 10.2 % (ref 5–12)
NEUTROPHILS # BLD AUTO: 3.84 10*3/MM3 (ref 1.7–7)
NEUTROPHILS NFR BLD AUTO: 54.2 % (ref 42.7–76)
NRBC BLD AUTO-RTO: 0 /100 WBC (ref 0–0.2)
PLATELET # BLD AUTO: 264 10*3/MM3 (ref 140–450)
PMV BLD AUTO: 10.7 FL (ref 6–12)
POTASSIUM BLD-SCNC: 4.4 MMOL/L (ref 3.5–5.2)
PROT SERPL-MCNC: 6.4 G/DL (ref 6–8.5)
RBC # BLD AUTO: 4.19 10*6/MM3 (ref 3.77–5.28)
SODIUM BLD-SCNC: 145 MMOL/L (ref 136–145)
WBC NRBC COR # BLD: 7.09 10*3/MM3 (ref 3.4–10.8)

## 2019-12-08 PROCEDURE — 80053 COMPREHEN METABOLIC PANEL: CPT

## 2019-12-08 PROCEDURE — 85025 COMPLETE CBC W/AUTO DIFF WBC: CPT

## 2020-01-12 NOTE — PROGRESS NOTES
Subjective   Anuja Echols is a 79 y.o. female here for   Chief Complaint   Patient presents with   • Follow-up   1 yr f/u breast CA, R mastectomy 2015, mammo done    History of Present Illness she is 4 years status post right mastectomy for an invasive breast cancer.  She was supposed to follow-up with medical oncology last year and did not do so.  She continues on the Arimidex.  She is not verbal.    The following portions of the patient's history were reviewed and updated as appropriate: allergies, current medications, past family history, past medical history, past social history, past surgical history and problem list.    Past Medical History:   Diagnosis Date   • Brain damage     Anoxic   • Breast cancer (CMS/Formerly [facility]) 12/2015    Stage II, F6H2mE3, invasive ductal, right, ER/DC positive, HER-2/denia negative.   • Dementia (CMS/Formerly [facility])    • Depression    • Disease of thyroid gland     Hypothyroid   • H/O Diaphragmatic hernia without obstruction or gangrene    • Mild intellectual disability    • Osteoporosis    • Seizures (CMS/Formerly [facility])    • Wheelchair bound     Possibly secondary to polio as child.       Past Surgical History:   Procedure Laterality Date   • BREAST BIOPSY     • MASTECTOMY Right 2015    Modified radical w/axilary dissection.       Family History   Problem Relation Age of Onset   • Breast cancer Neg Hx        Social History     Socioeconomic History   • Marital status:      Spouse name: Not on file   • Number of children: Not on file   • Years of education: Not on file   • Highest education level: Not on file   Occupational History     Employer: UNEMPLOYED   Tobacco Use   • Smoking status: Never Smoker   • Smokeless tobacco: Never Used   Substance and Sexual Activity   • Alcohol use: No   • Drug use: No       Current Outpatient Medications on File Prior to Visit   Medication Sig Dispense Refill   • acetaminophen (TYLENOL) 500 MG tablet Take 1,000 mg by mouth Every 8 (Eight) Hours As Needed for Mild  [facility]    Hospitalist Progress Note    Date of Service (when I saw the patient): 01/12/2020    Assessment & Plan   Good Petty is a 53 year old with hx of prediabetes and HL who was admitted on 1/11/2020 for spontaneous ICH    Acute to subacute left paracentral intraparenchymal hematoma with vasogenic edema  Presented with RLE numbness, tingling, and weakness. Head CT and brain MRI on arrival showed acute to subacute 3.2 cm intraparenchymal hematoma with the left paracentral lobule and a possible cavernoma within the lateral left postcentral gyrus. CTA head/neck showed non-specific mild narrowing of the R ICA. The patient denies history of trauma/falls. He reports a history of prediabetes and hyperlipidemia, but has no known history of hypertension. Neurocritical care was notified in the ED and medical management with strict blood pressure control was recommended. On admission, he was initiated on a nicardipine gtt.  - Repeat head CT today shows decrease in size of hemorrhage  - EEG today per Neuro  - Urine drug screen ordered by Neuro  - Neurology recommending cerebral angiogram, likely tomorrow   - IV hydralazine PRN for goal SBP<140  - Neuro checks q4h    Sinus bradycardia  HR 50s-60s. Asymptomatic  - Monitor on telemetry    Possible hypertension  TTE (1/11) showed mild to moderate LVH which is suggestive of uncontrolled hypertension, but patient's blood pressures have been acceptable thus far  - Monitor blood pressure for now  - IV hydralazine PRN for goal SBP<140    Hyperlipidemia  Lipid panel on admission showed Tchol 203, , HDL 63. ASCVD risk 7.1%  - Start simvastatin 20 mg qhs    Prediabetes  A1c 6.0  - Blood sugars have been acceptable during this hospitalization  - Follow up with PCP    Glaucoma  - Continues on PTA eye drops    FEN: Regular diet  DVT Prophylaxis: Pneumatic Compression Devices  Code Status: Full Code    Disposition: Expected discharge home once cleared by  Neuro, PT/OT. ?Ronnell Silva Jeison    Interval History   No events overnight. Reports persistent numbness/tingling in RLE and minimal HA, but offers no other concerns. Wants to go home.  - Start simvastatin for hyperlipidemia  - d/c nicardipine gtt and start PRN hydralazine  - EEG today per Neuro  - transfer to station [id number]    -Data reviewed today: I reviewed all new labs and imaging results over the last 24 hours. I personally reviewed the head CT which shows decrease in size of ICH    Physical Exam   Temp: 97  F (36.1  C) Temp src: Oral BP: 133/81 Pulse: 66 Heart Rate: 75 Resp: 15 SpO2: 100 % O2 Device: None (Room air)    Vitals:    01/11/20 1055 01/11/20 1610 01/12/20 0500   Weight: 83.9 kg (185 lb) 79.9 kg (176 lb 2.4 oz) 82.3 kg (181 lb 7 oz)     Vital Signs with Ranges  Temp:  [97  F (36.1  C)-98.5  F (36.9  C)] 97  F (36.1  C)  Pulse:  [51-84] 66  Heart Rate:  [49-90] 75  Resp:  [7-21] 15  BP: (102-151)/() 133/81  SpO2:  [96 %-100 %] 100 %  I/O last 3 completed shifts:  In: 374.24 [P.O.:340; I.V.:34.24]  Out: 1000 [Urine:1000]    Constitutional: Resting comfortably, NAD  Respiratory: Breathing non-labored. Lungs CTAB - no wheezes, crackles, or rhonchi  Cardiovascular: Heart RRR, no m/r/g. No pedal edema  GI: +BS, abd soft/NT  Skin/Integument: No rash  Musculoskeletal: Normal muscle bulk and tone  Neuro: Alert and appropriate, WALKER. 5/5 strength throughout  Psych: Calm and cooperative    Medications     niCARdipine 40 mg in 200 mL 0.9% NaCl Stopped (01/11/20 1901)       latanoprost  1 drop Both Eyes Daily     Data   Recent Labs   Lab 01/12/20  0425 01/11/20  1057   WBC  --  4.4   HGB  --  15.2   MCV  --  81   PLT  --  218   INR 1.05 0.98   NA  --  139   POTASSIUM  --  4.2   CHLORIDE  --  104   CO2  --  33*   BUN  --  14   CR  --  1.02   ANIONGAP  --  2*   JULIA  --  9.2   GLC  --  107*   ALBUMIN  --  4.4   PROTTOTAL  --  7.3   BILITOTAL  --  0.5   ALKPHOS  --  67   ALT  --  41   AST  --  27  Pain .     • anastrozole (ARIMIDEX) 1 MG chemo tablet Take  by mouth Daily.     • azithromycin (ZITHROMAX Z-MEGHA) 250 MG tablet Take 2 tablets the first day, then 1 tablet daily for 4 days. 6 tablet 0   • calcium-vitamin D (OYST-ITZEL/VITAMIN D) 500-200 MG-UNIT per tablet Take 1 tablet by mouth 2 (two) times a day.     • clotrimazole-betamethasone (LOTRISONE) 1-0.05 % cream      • Dextromethorphan-Guaifenesin (ROBAFEN DM PO) Take 10 mL by mouth.     • donepezil (ARICEPT) 10 MG tablet Take  by mouth.     • gabapentin (NEURONTIN) 300 MG capsule Take  by mouth 2 (two) times a day.     • ipratropium-albuterol (DUO-NEB) 0.5-2.5 mg/mL nebulizer Ipratropium-Albuterol 0.5-2.5 (3) MG/3ML Inhalation Solution; Patient Sig: Ipratropium-Albuterol 0.5-2.5 (3) MG/3ML Inhalation Solution ; 90; 0; 16-Sep-2015; Active     • levETIRAcetam (KEPPRA) 100 MG/ML solution Take  by mouth 2 (two) times a day.     • levothyroxine (SYNTHROID, LEVOTHROID) 75 MCG tablet Take  by mouth.     • LORazepam (ATIVAN) 0.5 MG tablet Take  by mouth.     • MAGNESIUM HYDROXIDE PO Take 30 mL by mouth every night.     • Nutritional Supplements (PROMOD) liquid Take 30 mL by mouth.     • nystatin (MYCOSTATIN) 250758 UNIT/GM cream Apply  topically.     • oyster shell calcium 500 MG tablet tablet Take  by mouth 2 (two) times a day.     • VIMPAT 200 MG tablet 2 (two) times a day.     • docusate (COLACE) 50 MG/5ML liquid Take 50 mg by mouth 2 (two) times a day.     • [DISCONTINUED] LORazepam (ATIVAN) 2 MG/ML injection LORazepam 2 MG/ML Injection Solution; Patient Sig: LORazepam 2 MG/ML Injection Solution ; 2; 0; 07-Oct-2015; Active       No current facility-administered medications on file prior to visit.          Review of Systems  Unobtainable    Objective   Physical Exam elderly white female in no active distress.  She is in a wheelchair.  She has no supraclavicular nor axillary adenopathy.  Lungs are clear and equal heart shows a regular rate and rhythm.  Right        Recent Results (from the past 24 hour(s))   CT Head w/o Contrast    Narrative    CT SCAN OF THE HEAD WITHOUT CONTRAST   1/11/2020 2:06 PM     HISTORY: Abnormal brain MRI; rule out hematoma.    TECHNIQUE:  Axial images of the head and coronal reformations without  IV contrast material. Radiation dose for this scan was reduced using  automated exposure control, adjustment of the mA and/or kV according  to patient size, or iterative reconstruction technique.    COMPARISON: Head MRI 1/11/2020.    FINDINGS:  An ovoid area of hyperattenuation is present within the  left paracentral lobule measuring 1.5 x 2.8 x 2.2 cm by CT (AP x TR x  SI) corresponding to same day MRI findings. Mild surrounding visited  edema is present. Parenchyma is otherwise unremarkable. The size  calvarium, tympanic cavities, and mastoid cavities are unremarkable.      Impression    IMPRESSION: Acute/subacute intraparenchymal hematoma within the left  paracentral lobule with mild surrounding vasogenic edema. Recommend  continued follow-up.    URVASHI HILL MD   CTA Head Neck with Contrast    Narrative    CT ANGIOGRAM OF THE HEAD AND NECK WITH CONTRAST  1/11/2020 2:07 PM     HISTORY: Right leg weakness.    TECHNIQUE:  CT angiography with an injection of 70 mL [id number]-[id number] IV  with scans through the head and neck. Images were transferred to a  separate 3-D workstation where multiplanar reformations and 3-D images  were created. Estimates of carotid stenoses are made relative to the  distal internal carotid artery diameters except as noted. Radiation  dose for this scan was reduced using automated exposure control,  adjustment of the mA and/or kV according to patient size, or iterative  reconstruction technique.    COMPARISON: None.     CT ANGIOGRAM HEAD FINDINGS:  The left vertebral artery predominately  terminates in posterior inferior cerebellar artery. The right  vertebral artery, basilar artery, and posterior cerebral arteries are  patent.  chest wall shows no evidence of nodularity or problems with the scar.  Left breast is pendulous without dominant mass.  Mammogram was reviewed and showed diffuse calcifications but no malignant appearing lesions I reviewed the films myself.              Assessment/Plan   Follow-up for breast cancer.  She needs to follow-up with medical oncology.  I would like to see her back in 1 year with a unilateral mammogram.            The internal carotid arteries, intracerebral arteries, and  middle cerebral arteries are patent. No evidence of aneurysm or  vascular malformation. A questionable punctate area of enhancement is  present within or along the margin of the hematoma centered within the  left paracentral lobule, probably representing a displaced venous  structure (series 9 image 71).    CT ANGIOGRAM NECK FINDINGS: A three-vessel aortic arch is present. The  bilateral common carotid, internal carotid, external carotid, and  vertebral arteries are patent. There is slight irregularity at the  right carotid bifurcation with mild narrowing of the right internal  carotid artery at the origin related to eccentric noncalcified  material (series 10 image 73).     Mild lower cervical spine degenerative change is present. No  periapical dental abscesses are identified.      Impression    IMPRESSION:   1. Punctate (1 mm) area of enhancement along the margin of the  intraparenchymal hematoma centered within the left paracentral lobule.  Differential diagnosis includes normal displaced cortical vein  (favored) or less likely punctate area of contrast  extravasation/puddling. Tiny underlying vascular abnormality cannot be  excluded.  2. Mild narrowing of the right internal carotid artery at the  bifurcation related to eccentrically located noncalcified soft tissue  which may represent noncalcified plaque or adherent thrombus.    Findings were discussed by phone between Dr. Mcbride and Dr. Harvey  2:21 PM on 1/11/2020.    URVASHI MCBRIDE MD   XR Chest Port 1 View    Narrative    CHEST PORTABLE ONE VIEW   1/11/2020 4:55 PM     HISTORY: HTN.    COMPARISON: None.    FINDINGS:  The lungs are clear. No pleural effusions or pneumothorax.  Heart size and pulmonary vascularity are within normal limits. No  acute fracture. The costophrenic angles are not completely included  and cannot be completely evaluated.      Impression    IMPRESSION: Costophrenic angles  are not completely included and cannot  be completely evaluated. No evidence of acute cardiopulmonary disease  is seen.    JAN PETTY MD   Echocardiogram Complete    Narrative    [id number]  [id number]  [id number]  [id number]^TAYLOR^DIANA^ABEL           [facility]  Echocardiography Laboratory  [address] [place] [address]        Name: VARUN VAZQUEZ  MRN: [id number]  : 1966  Study Date: 2020 08:05 AM  Age: 53 yrs  Gender: Male  Patient Location: [facility]  Reason For Study: Hypertension (HTN)  Ordering Physician: DIANA VAZQUEZ  Performed By: Adriano Marley     BSA: 2.0 m2  Height: 71 in  Weight: 185 lb  HR: 62  BP: 121/93 mmHg  _____________________________________________________________________________  __        Procedure  Complete Portable Echo Adult.  _____________________________________________________________________________  __        Interpretation Summary     Left ventricular systolic function is normal. The visual ejection fraction is  estimated at 55-60%.  There is mild to moderate concentric left ventricular hypertrophy but a normal  left atrial size.  Diastolic Doppler findings (E/E' ratio and/or other parameters) suggest left  ventricular filling pressures are normal.  There is no comparison study available.  _____________________________________________________________________________  __        Left Ventricle  The left ventricle is normal in size. There is mild to moderate concentric  left ventricular hypertrophy. Left ventricular systolic function is normal.  The visual ejection fraction is estimated at 55-60%. Diastolic Doppler  findings (E/E' ratio and/or other parameters) suggest left ventricular filling  pressures are normal. No regional wall motion abnormalities noted.     Right Ventricle  The right ventricle is normal size. The right ventricular systolic function is  normal. The right ventricle is not well visualized.     Atria  Normal left  atrial size. Borderline right atrial enlargement. There is no  atrial shunt seen.     Mitral Valve  Thickened mitral valve anterior leaflet. There is trace mitral regurgitation.        Tricuspid Valve  There is trace tricuspid regurgitation. The right ventricular systolic  pressure is approximated at 12.3 mmHg plus the right atrial pressure. IVC  diameter and respiratory changes fall into an intermediate range suggesting an  RA pressure of 8 mmHg.     Aortic Valve  No aortic regurgitation is present. No hemodynamically significant valvular  aortic stenosis.     Pulmonic Valve  There is no pulmonic valvular stenosis.     Vessels  Normal size aorta.     Pericardium  The pericardium appears normal.        Rhythm  Sinus rhythm was noted.  _____________________________________________________________________________  __  MMode/2D Measurements & Calculations     IVSd: 1.5 cm  LVIDd: 4.6 cm  LVIDs: 3.1 cm  LVPWd: 1.2 cm  FS: 31.8 %  LV mass(C)d: 241.8 grams  LV mass(C)dI: 118.5 grams/m2  Ao root diam: 3.7 cm  asc Aorta Diam: 3.5 cm  LVOT diam: 2.2 cm  LVOT area: 3.7 cm2  LA Volume (BP): 55.3 ml  LA Volume Index (BP): 27.1 ml/m2  RWT: 0.54           Doppler Measurements & Calculations  MV E max osmin: 50.9 cm/sec  MV A max osmin: 47.3 cm/sec  MV E/A: 1.1  MV dec slope: 152.1 cm/sec2  MV dec time: 0.33 sec  PA acc time: 0.13 sec  TR max osmin: 175.1 cm/sec  TR max P.3 mmHg  Pulm Sys Osmin: 66.9 cm/sec  Pulm Mendez Osmin: 45.6 cm/sec  Pulm S/D: 1.5  E/E' av.3  Lateral E/e': 3.9  Medial E/e': 6.7              _____________________________________________________________________________  __        Report approved by: Traci Posey 2020 09:30 AM      CT Head w/o Contrast    Narrative    CT SCAN OF THE HEAD WITHOUT CONTRAST   2020 10:09 AM     HISTORY: Hemorrhage.    TECHNIQUE:  Axial images of the head and coronal reformations without  IV contrast material. Radiation dose for this scan was reduced using  automated  exposure control, adjustment of the mA and/or kV according  to patient size, or iterative reconstruction technique.    COMPARISON: Head CT 1/11/2020    FINDINGS:  Intraparenchymal hematoma centered within the left  paracentral lobule is decreased in size compared to the prior exam  currently measuring approximately 1.5 x 2.5 x 1.8 cm (AP x TR x SI).  Mild surrounding vasogenic edema is present, similar compared to the  prior exam. No evidence of new hemorrhage. Parenchyma is otherwise  unremarkable. The calvarium, tympanic cavities, and mastoid cavities  are unremarkable.      Impression    IMPRESSION:  Decreased size of intraparenchymal hematoma centered  within the left paracentral lobule.    URVASHI HILL MD

## 2020-06-04 ENCOUNTER — APPOINTMENT (OUTPATIENT)
Dept: LAB | Facility: HOSPITAL | Age: 80
End: 2020-06-04

## 2020-10-02 DIAGNOSIS — Z85.3 HX: BREAST CANCER: ICD-10-CM

## 2020-10-02 DIAGNOSIS — Z12.39 BREAST CANCER SCREENING, HIGH RISK PATIENT: Primary | ICD-10-CM

## 2020-10-02 DIAGNOSIS — C50.111 MALIGNANT NEOPLASM OF CENTRAL PORTION OF RIGHT FEMALE BREAST, UNSPECIFIED ESTROGEN RECEPTOR STATUS (HCC): ICD-10-CM

## 2020-10-05 ENCOUNTER — APPOINTMENT (OUTPATIENT)
Dept: MAMMOGRAPHY | Facility: HOSPITAL | Age: 80
End: 2020-10-05

## 2020-10-13 ENCOUNTER — HOSPITAL ENCOUNTER (OUTPATIENT)
Dept: MAMMOGRAPHY | Facility: HOSPITAL | Age: 80
Discharge: HOME | End: 2020-10-13
Admitting: SURGERY

## 2020-10-13 DIAGNOSIS — Z12.39 BREAST CANCER SCREENING, HIGH RISK PATIENT: ICD-10-CM

## 2020-10-13 DIAGNOSIS — Z85.3 HX: BREAST CANCER: ICD-10-CM

## 2020-10-13 DIAGNOSIS — C50.111 MALIGNANT NEOPLASM OF CENTRAL PORTION OF RIGHT FEMALE BREAST, UNSPECIFIED ESTROGEN RECEPTOR STATUS (HCC): ICD-10-CM

## 2020-10-13 PROCEDURE — G0279 TOMOSYNTHESIS, MAMMO: HCPCS

## 2020-10-13 PROCEDURE — 77065 DX MAMMO INCL CAD UNI: CPT

## 2020-10-14 ENCOUNTER — OFFICE VISIT (OUTPATIENT)
Dept: SURGERY | Facility: CLINIC | Age: 80
End: 2020-10-14

## 2020-10-14 DIAGNOSIS — Z17.0 MALIGNANT NEOPLASM OF CENTRAL PORTION OF RIGHT BREAST IN FEMALE, ESTROGEN RECEPTOR POSITIVE (HCC): Primary | ICD-10-CM

## 2020-10-14 DIAGNOSIS — C50.111 MALIGNANT NEOPLASM OF CENTRAL PORTION OF RIGHT BREAST IN FEMALE, ESTROGEN RECEPTOR POSITIVE (HCC): Primary | ICD-10-CM

## 2020-10-14 PROCEDURE — 99212 OFFICE O/P EST SF 10 MIN: CPT | Performed by: SURGERY

## 2020-10-14 NOTE — PROGRESS NOTES
Subjective   Anuja Echols is a 80 y.o. female here for   Chief Complaint   Patient presents with   • Follow-up     mammo       History of Present Illness she is difficult to communicate with.  She does not really respond well to asked questions.  She is here today to follow-up for her right breast mastectomy performed by Dr. Jennings.  She recently had a mammogram.    The following portions of the patient's history were reviewed and updated as appropriate: allergies, current medications, past family history, past medical history, past social history, past surgical history and problem list.    Past Medical History:   Diagnosis Date   • Brain damage     Anoxic   • Breast cancer (CMS/Formerly [facility]) 12/2015    Stage II, [id number], invasive ductal, right, ER/WA positive, HER-2/denia negative.   • Dementia (CMS/Formerly [facility])    • Depression    • Disease of thyroid gland     Hypothyroid   • H/O Diaphragmatic hernia without obstruction or gangrene    • Mild intellectual disability    • Osteoporosis    • Seizures (CMS/Formerly [facility])    • Wheelchair bound     Possibly secondary to polio as child.       Past Surgical History:   Procedure Laterality Date   • BREAST BIOPSY     • MASTECTOMY Right 2015    Modified radical w/axilary dissection.       Family History   Problem Relation Age of Onset   • Breast cancer Neg Hx        Social History     Socioeconomic History   • Marital status:      Spouse name: Not on file   • Number of children: Not on file   • Years of education: Not on file   • Highest education level: Not on file   Occupational History     Employer: UNEMPLOYED   Tobacco Use   • Smoking status: Never Smoker   • Smokeless tobacco: Never Used   Substance and Sexual Activity   • Alcohol use: No   • Drug use: No       Current Outpatient Medications on File Prior to Visit   Medication Sig Dispense Refill   • acetaminophen (TYLENOL) 500 MG tablet Take 1,000 mg by mouth Every 8 (Eight) Hours As Needed for Mild Pain .     • anastrozole (ARIMIDEX) 1  MG chemo tablet Take  by mouth Daily.     • calcium-vitamin D (OYST-ITZEL/VITAMIN D) 500-200 MG-UNIT per tablet Take 1 tablet by mouth 2 (two) times a day.     • Dextromethorphan-Guaifenesin (ROBAFEN DM PO) Take 10 mL by mouth.     • docusate (COLACE) 50 MG/5ML liquid Take 50 mg by mouth 2 (two) times a day.     • gabapentin (NEURONTIN) 300 MG capsule Take  by mouth 2 (two) times a day.     • ipratropium-albuterol (DUO-NEB) 0.5-2.5 mg/mL nebulizer Ipratropium-Albuterol 0.5-2.5 (3) MG/3ML Inhalation Solution; Patient Sig: Ipratropium-Albuterol 0.5-2.5 (3) MG/3ML Inhalation Solution ; 90; 0; 16-Sep-2015; Active     • levETIRAcetam (KEPPRA) 100 MG/ML solution Take  by mouth 2 (two) times a day.     • levothyroxine (SYNTHROID, LEVOTHROID) 75 MCG tablet Take  by mouth.     • LORazepam (ATIVAN) 0.5 MG tablet Take  by mouth Every 6 (Six) Hours.     • MAGNESIUM HYDROXIDE PO Take 30 mL by mouth every night.     • Nutritional Supplements (PROMOD) liquid Take 30 mL by mouth.     • nystatin (MYCOSTATIN) 506497 UNIT/GM cream Apply  topically.     • O2 (OXYGEN) Inhale 1 (One) Time. O2 at 2L via nasa cannula as needed to keep sats above 90%     • oyster shell calcium 500 MG tablet tablet Take  by mouth 2 (two) times a day.     • VIMPAT 200 MG tablet 2 (two) times a day.     • clotrimazole-betamethasone (LOTRISONE) 1-0.05 % cream      • donepezil (ARICEPT) 10 MG tablet Take  by mouth.       No current facility-administered medications on file prior to visit.          Review of Systems unobtainable from the patient      Objective   Physical Exam elderly white female in a wheelchair she has no supraclavicular nor axillary adenopathy.  Her right chest wall shows a well-healed scar without mass.  There was no dominant left breast mass.  Her lungs were clear and equal her heart shows a regular rate and rhythm.  Her recent mammogram was a BI-RADS 2.              Assessment/Plan   History of breast cancer without apparent recurrence by  physical exam and mammogram.  Recheck in 1 year with a unilateral mammogram.

## 2021-02-19 ENCOUNTER — TELEPHONE (OUTPATIENT)
Dept: GASTROENTEROLOGY | Facility: CLINIC | Age: 81
End: 2021-02-19

## 2021-02-19 ENCOUNTER — TRANSCRIBE ORDERS (OUTPATIENT)
Dept: ADMINISTRATIVE | Facility: HOSPITAL | Age: 81
End: 2021-02-19

## 2021-02-19 DIAGNOSIS — R13.12 OROPHARYNGEAL DYSPHAGIA: Primary | ICD-10-CM

## 2021-02-19 NOTE — TELEPHONE ENCOUNTER
Call from Airam, nursing [place].  She states feeding patient with teaspoon even liquids and she still is chocking and coughing.  Told her new new Video Swallowing test, per Kishan.  Airam will order the swallowing test.  She will call us back once its scheduled.  Patient did refused at first, but now wants the G-tube.  Please advise.

## 2021-03-08 ENCOUNTER — TRANSCRIBE ORDERS (OUTPATIENT)
Dept: ADMINISTRATIVE | Facility: HOSPITAL | Age: 81
End: 2021-03-08

## 2021-03-08 DIAGNOSIS — Z78.9 ENCOUNTER FOR GASTROJEJUNAL TUBE PLACEMENT: Primary | ICD-10-CM

## 2021-03-10 ENCOUNTER — HOSPITAL ENCOUNTER (OUTPATIENT)
Dept: GENERAL RADIOLOGY | Facility: HOSPITAL | Age: 81
Discharge: HOME | End: 2021-03-10
Admitting: INTERNAL MEDICINE

## 2021-03-10 DIAGNOSIS — Z78.9 ENCOUNTER FOR GASTROJEJUNAL TUBE PLACEMENT: ICD-10-CM

## 2021-03-10 PROCEDURE — 74230 X-RAY XM SWLNG FUNCJ C+: CPT

## 2021-03-10 PROCEDURE — 92611 MOTION FLUOROSCOPY/SWALLOW: CPT

## 2021-03-10 NOTE — MBS/VFSS/FEES
Outpatient Speech Language Pathology   Adult Swallow Initial Evaluation   Modified Barium Swallow Study  LISA Richardson     Patient Name: Anuja Echols  : 1940  MRN: [id number]  Today's Date: 3/10/2021         Visit Date: 03/10/2021   Patient Active Problem List   Diagnosis   • Malignant neoplasm of central portion of right female breast (CMS/HCC)   • Dementia (CMS/HCC)   • Seizures (CMS/HCC)   • Anoxic brain injury (CMS/HCC)        Past Medical History:   Diagnosis Date   • Brain damage     Anoxic   • Breast cancer (CMS/HCC) 2015    Stage II, D8H4rM0, invasive ductal, right, ER/AK positive, HER-2/denia negative.   • Dementia (CMS/HCC)    • Depression    • Disease of thyroid gland     Hypothyroid   • H/O Diaphragmatic hernia without obstruction or gangrene    • Mild intellectual disability    • Osteoporosis    • Seizures (CMS/HCC)    • Wheelchair bound     Possibly secondary to polio as child.        Past Surgical History:   Procedure Laterality Date   • BREAST BIOPSY     • MASTECTOMY Right     Modified radical w/axilary dissection.         Visit Dx:     ICD-10-CM ICD-9-CM   1. Encounter for gastrojejunal tube placement  Z78.9 V49.9           SLP Adult Swallow Evaluation     Row Name 03/10/21 1345       Rehab Evaluation    Document Type  evaluation  -AD    Total Evaluation Minutes, SLP  55  -AD    Subjective Information  -- Pt is mostly nonverbal; difficult to understand at times  -AD    Patient Observations  alert;cooperative nods head yes in agreement  -AD    Patient/Family/Caregiver Comments/Observations  Pt was seen with EMS staff present and assisting with positioning. Seen on stretcher.   -AD    Care Plan Review  evaluation/treatment results reviewed unsure of patient's understanding  -AD    Patient Effort  good  -AD    Symptoms Noted During/After Treatment  none  -AD       General Information    Patient Profile Reviewed  yes  -AD    Pertinent History Of Current Problem  Pt is an 79 y/o resident of  [facility]. She was referred for a modified barium swallow study, from what I can gather, to determine need for possible feeding tube placement. Pt with hx of oropharyngeal dysphagia with recent worsening and s/s of aspiration at meals on modified diet of puree and honey thick liquids. Concerns for nutrtion and comfort with eating reported. Medical hx also significant for conversion disorder with seizures, anoxic brain damage, mild intellectual disabiilty, dementia, contractures, malignant neoplasm of the right breast and weakness.   -AD    Current Method of Nutrition  pureed;honey-thick liquids  -AD    Precautions/Limitations, Vision  WFL;for purposes of eval  -AD    Precautions/Limitations, Hearing  other (see comments) appears adequate but unable to fully assess  -AD    Prior Level of Function-Communication  cognitive-linguistic impairment;motor speech impairment  -AD    Prior Level of Function-Swallowing  puree;honey thick liquids;concerns regarding malnutrition  -AD    Plans/Goals Discussed with  patient;other (see comments) unsure of pt's understanding  -AD    Barriers to Rehab  medically complex;previous functional deficit;cognitive status  -AD    Patient's Goals for Discharge  patient could not state  -AD       Pain    Additional Documentation  -- No pain indicated  -AD       Oral Motor Structure and Function    Oral Lesions or Structural Abnormalities and/or variants  none  -AD    Dentition Assessment  natural, present and adequate;other (see comments) some missing teeth  -AD    Secretion Management  problems swallowing secretions  -AD    Mucosal Quality  moist, healthy  -AD    Volitional Swallow  unable to elicit  -AD    Volitional Cough  unable to elicit  -AD       Oral Musculature and Cranial Nerve Assessment    Oral Motor General Assessment  unable to assess  -AD    Oral Motor, Comment  Pt was unable to follow directions for oral motor exam.   -AD       General Eating/Swallowing  Observations    Respiratory Support Currently in Use  room air  -AD    Eating/Swallowing Skills  fed by SLP  -AD    Positioning During Eating  upright 90 degree;other (see comments) on stretcher  -AD       Respiratory    Respiratory Status  room air;other (see comments) congested breath sounds at rest, upper airway  -AD       MBS/VFSS    Utensils Used  spoon;straw  -AD    Consistencies Trialed  pureed;thin liquids;nectar/syrup-thick liquids;honey-thick liquids  -AD       MBS/VFSS Interpretation    Oral Prep Phase  impaired oral phase of swallowing  -AD    Oral Transit Phase  impaired  -AD    Oral Residue  impaired  -AD    VFSS Summary  Pt presents with a moderate oral and severe pharyngeal phase dysphagia. High risk of aspiration on all consistencies noted. Decreased oral prep and control noted with material pooling in the valleculae and pyriforms prior to the swallow. Mild oral residue in the floor of the mouth with some anterior loss. Appears to thin with saliva production as well. Delay of swallow with pooled material increasing risk of aspiration. Pt demonstrated inconsistent penetration of honey thick liquids and nectar thick liquids. Aspiration of puree on 1/3 trials, nectar on at least 1/3 trials, honey with at least deep penetration to the cords and possible trace aspiration on 1/5 trials, thins with at least deep penetration and aspiration of all trials. All due to severity of pooled material and delay of swallow resulting in decreased elevation during the swallow and decreased vestibular closure. Pt with significant cough response on puree material aspirated but no response (silent) aspiration on all liquid consistencies. Mild to moderate pharyngeal residue after the swallow that could partially be cleared with only subsquent swallows performed by the patient. Pt with greatest residue in the valleculae (moderate) and mild in the pyriform sinuses. Coating on the posterior pharyngeal wall at the level of the  epiglottis.   -AD    Oral Phase, Comment  Pt presents with a moderate oral dysphagia with decreased oral prep, transit and oral residue. Pt with weak labial closure resulting in anterior loss of thins and decreased lip closure around the utensil. Decreased bolus control with material falling to the floor of the mouth and inability to hold on the tongue. Tongue pumping noted on all trials to move the bolus into the pharynx.   -AD       Oral Preparatory Phase    Oral Preparatory Phase  reduced lip closure around utensil;anterior loss;inadequate manipulation  -AD    Reduced Lip Closure Around Utensil  thin liquids;nectar-thick liquids;honey-thick liquids;pudding/puree;all consistencies tested;secondary to reduced labial seal;secondary to reduced lingual strength  -AD    Anterior Loss  thin liquids;secondary to reduced lingual strength;other (see comments) residual material after honey/puree  -AD    Inadequate Manipulation  thin liquids;nectar-thick liquids;honey-thick liquids;pudding/puree;all consistencies tested;secondary to reduced labial seal;secondary to reduced lingual strength;secondary to reduced lingual range of motion  -AD       Oral Transit Phase    Impaired Oral Transit Phase  tongue pumping;other (see comments) pooled material due to delayed stage transition  -AD    Tongue Pumping  thin liquids;nectar-thick liquids;honey-thick liquids;pudding/puree;all consistencies tested;secondary to reduced lingual control  -AD       Oral Residue    Impaired Oral Residue  floor of mouth residue;lingual residue  -AD    Floor of Mouth Residue  thin liquids;nectar-thick liquids;honey-thick liquids;secondary to reduced lingual strength;secondary to reduced lingual range of motion  -AD    Lingual Residue  thin liquids;nectar-thick liquids;honey-thick liquids;secondary to reduced lingual strength  -AD    Response to Oral Residue  unable to clear residue;other (see comments)  -AD    Oral Residue, Comment  Coating that mixes  w/saliva and does thins, pooling in floor of mouth.  -AD       Initiation of Pharyngeal Swallow    Initiation of Pharyngeal Swallow  bolus in valleculae;bolus in pyriform sinuses  -AD    Pharyngeal Phase  impaired pharyngeal phase of swallowing  -AD    Penetration Before the Swallow  thin liquids;secondary to reduced back of tongue control;secondary to delayed swallow initiation or mistiming  -AD    Penetration During the Swallow  thin liquids;nectar-thick liquids;honey-thick liquids;secondary to delayed swallow initiation or mistiming;secondary to reduced vestibular closure  -AD    Aspiration During the Swallow  thin liquids;nectar-thick liquids;pudding/puree;secondary to delayed swallow initiation or mistiming;secondary to reduced vestibular closure;other (see comments) due to pyriform residue on puree  -AD    Response to Penetration  deep;no response;could not produce cough response despite cue  -AD    Response to Aspiration  no response, silent aspiration;cough;reflexive response;could not clear subglottic material;other (see comments) cough triggered on puree aspiration only  -AD    Rosenbek's Scale  pudding/puree:;7--->level 7;thin:;nectar:;8--->level 8;honey:;5--->level 5  -AD    Pharyngeal Residue  thin liquids;nectar-thick liquids;honey-thick liquids;pudding/puree;base of tongue;valleculae;pyriform sinuses;posterior pharyngeal wall;laryngeal vestibule;secondary to reduced base of tongue retraction;secondary to reduced posterior pharyngeal wall stripping;other (see comments) pyriforms due to cricopharyngeal hypertrophy  -AD    Response to Residue  cleared residue with spontaneous subsequent swallow;other (see comments) intermittently noted; not consistent  -AD    Attempted Compensatory Maneuvers  bolus size;bolus presentation style;additional subsequent swallow  -AD    Response to Attempted Compensatory Maneuvers  did not prevent penetration;did not prevent aspiration;reduced residue  -AD    Pharyngeal  Phase, Comment  Pt presents with a severe pharyngeal phase with deficits of delay of swallow resulting in pooling of material in the valleculae and pyriforms on thins. Penetration and aspiration due to delay and overflow of pooled material into the vestibule. This was silent in nature on thins, nectar and honey thick liquids. Large amount of aspiration was viewed on the initial trial of puree with immediate, strong cough response. Pt with mild to moderate vallecular residue on thins, nectar, honey and puree. Greater vallecular residue on puree. Mild residue of thins, honey and nectar in the pyriform sinuses. Mild posterior pharyngeal wall residue on all consistencies . All residue due to decreased back of tongue and decreased posterior pharyngeal stripping wave. Pt was able to clear some residue spontaneously with a subsequent swallow from the valleculae. She did not perform this consistently. Residual material in the vestibule also noted and pt was unable to clear spontaneously or with a cued cough. Cricopharyngeal hypertrophy also noted and resulted in retrograde flow of material into the pyriforms at the end of the swallow.   -AD       Clinical Impression    SLP Swallowing Diagnosis  moderate;oral dysphagia;severe;pharyngeal dysphagia  -AD    Functional Impact  risk of aspiration/pneumonia;risk of malnutrition;risk of dehydration  -AD    Swallow Criteria for Skilled Therapeutic Interventions Met  other (see comments) per SLP at [facility]  -AD       Recommendations    Therapy Frequency (Swallow)  evaluation only  -AD    SLP Diet Recommendation  NPO;other (see comments) vs comfort diet w/known risks of aspiration  -AD    Recommended Precautions and Strategies  upright posture during/after eating;small bites of food and sips of liquid;general aspiration precautions;fatigue precautions;1:1 supervision;assist with feeding  -AD    Oral Care Recommendations  Oral Care before breakfast, after meals and  PRN;Toothbrush  -AD    SLP Rec. for Method of Medication Administration  meds via alternate route  -AD    Monitor for Signs of Aspiration  yes;no;cough;elevated WBC count;gurgly voice;throat clearing;none - silent aspiration present  -AD    Anticipated Discharge Disposition (SLP)  extended care facility  -AD      User Key  (r) = Recorded By, (t) = Taken By, (c) = Cosigned By    Initials Name Provider Type    Jayla Jean Baptiste MS CCC-SLP Speech and Language Pathologist            OP SLP Education     Row Name 03/10/21 1345       Education    Barriers to Learning  Other (comment0 cognitive status  -AD    Action Taken to Address Barriers  results to be faxed to [facility]  -    Assessed  Learning needs;Learning motivation;Learning preferences;Learning readiness  -AD    Learning Motivation  Other (comment) Pt does not demonstrate new learning  -AD    Education Comments  Pt does not demonstrate understanding of new learning. No staff present with patient. Will send results to [facility].   -AD      User Key  (r) = Recorded By, (t) = Taken By, (c) = Cosigned By    Initials Name Effective Dates    Jayla Jean Baptiste MS CCC-SLP 08/09/20 -             OP SLP Assessment/Plan - 03/10/21 1345        SLP Assessment    Functional Problems  Swallowing   -AD    Impact on Function: Swallowing  Risk of malnourishment;Risk of dehydration;Risk of aspiration;Risk of pneumonia   -AD    Clinical Impression: Swallowing  Moderate:;oral phase dysphagia;Severe:;pharyngeal phase dysphagia   -AD    Functional Problems Comment  Coughing with po intake and decreased po intake   -AD    Prognosis  Poor (comment)    due to comorbidites; progression of dysphagia; cognitive imp  -AD       SLP Plan    Frequency  eval only   -AD    Planned CPT's?  SLP MBS: 97348   -AD      User Key  (r) = Recorded By, (t) = Taken By, (c) = Cosigned By    Initials Name Provider Type    Jayla Jean Baptiste MS CCC-SLP Speech and Language Pathologist              Time Calculation:   SLP Start Time: 1250  SLP Stop Time: 1345  SLP Time Calculation (min): 55 min  SLP Non-Billable Time (min): 0 min  Total Timed Code Minutes- SLP: 0 minute(s)    Therapy Charges for Today     Code Description Service Date Service Provider Modifiers Qty    42862488635 HC ST MOTION FLUORO EVAL SWALLOW 4 3/10/2021 Jayla Herrera, MS CCC-SLP GN 1            Jayla Herrera MS CCC-SLP  3/10/2021

## 2021-03-15 ENCOUNTER — APPOINTMENT (OUTPATIENT)
Dept: GENERAL RADIOLOGY | Facility: HOSPITAL | Age: 81
End: 2021-03-15

## 2021-03-18 ENCOUNTER — TELEPHONE (OUTPATIENT)
Dept: GASTROENTEROLOGY | Facility: CLINIC | Age: 81
End: 2021-03-18

## 2021-03-18 NOTE — TELEPHONE ENCOUNTER
----- Message from Dave Howard MD sent at 3/18/2021  1:13 PM EDT -----  Regarding: RE: FL VIDEO SWALLOW  So she is high risk for aspiration and if family is amenable we should put her on schedule for next week for a PEG- ( would tell hospital to expect 23 hour obs post procedure admit) so in your conversations with signature who is the POA I can talk to before and after her procedure?  ----- Message -----  From: Tori Miner  Sent: 3/17/2021   1:54 PM EDT  To: Dave Howard MD  Subject: FL VIDEO SWALLOW                                 Could you please review.  Airam from [employer] is calling about getting G-tube scheduled.  THANK YOU!!

## 2021-03-18 NOTE — TELEPHONE ENCOUNTER
NATALIE Meadows [phone]. Scheduled at [place] on 03/24/2021 after his last case.  Arrive 3pm.  Will call and notify Airam at [facility] [phone].    Notify Airam at [facility].

## 2021-03-22 ENCOUNTER — PREP FOR SURGERY (OUTPATIENT)
Dept: OTHER | Facility: HOSPITAL | Age: 81
End: 2021-03-22

## 2021-03-22 DIAGNOSIS — R13.12 OROPHARYNGEAL DYSPHAGIA: Primary | ICD-10-CM

## 2021-03-23 ENCOUNTER — ANESTHESIA EVENT (OUTPATIENT)
Dept: PERIOP | Facility: HOSPITAL | Age: 81
End: 2021-03-23

## 2021-03-24 ENCOUNTER — ANESTHESIA (OUTPATIENT)
Dept: PERIOP | Facility: HOSPITAL | Age: 81
End: 2021-03-24

## 2021-03-24 ENCOUNTER — HOSPITAL ENCOUNTER (OUTPATIENT)
Facility: HOSPITAL | Age: 81
Discharge: NURSING, REHAB OR LONG-TERM CARE | End: 2021-03-25
Attending: INTERNAL MEDICINE | Admitting: INTERNAL MEDICINE

## 2021-03-24 PROBLEM — R13.12 DYSPHAGIA, OROPHARYNGEAL: Status: ACTIVE | Noted: 2021-03-24

## 2021-03-24 PROCEDURE — 63710000001 GABAPENTIN 300 MG CAPSULE: Performed by: INTERNAL MEDICINE

## 2021-03-24 PROCEDURE — 25010000002 PROPOFOL 10 MG/ML EMULSION: Performed by: REGISTERED NURSE

## 2021-03-24 PROCEDURE — 63710000001: Performed by: INTERNAL MEDICINE

## 2021-03-24 PROCEDURE — A9270 NON-COVERED ITEM OR SERVICE: HCPCS | Performed by: INTERNAL MEDICINE

## 2021-03-24 PROCEDURE — G0378 HOSPITAL OBSERVATION PER HR: HCPCS

## 2021-03-24 PROCEDURE — 63710000001 DOCUSATE SODIUM 150 MG/15ML LIQUID: Performed by: INTERNAL MEDICINE

## 2021-03-24 PROCEDURE — 63710000001 LEVETIRACETAM 100 MG/ML SOLUTION: Performed by: INTERNAL MEDICINE

## 2021-03-24 PROCEDURE — 63710000001 ANASTROZOLE 1 MG TABLET: Performed by: INTERNAL MEDICINE

## 2021-03-24 PROCEDURE — 43246 EGD PLACE GASTROSTOMY TUBE: CPT | Performed by: INTERNAL MEDICINE

## 2021-03-24 PROCEDURE — 63710000001 LORAZEPAM 0.5 MG TABLET: Performed by: INTERNAL MEDICINE

## 2021-03-24 RX ORDER — ANASTROZOLE 1 MG/1
1 TABLET ORAL DAILY
Status: DISCONTINUED | OUTPATIENT
Start: 2021-03-24 | End: 2021-03-25 | Stop reason: HOSPADM

## 2021-03-24 RX ORDER — LEVOTHYROXINE SODIUM 0.07 MG/1
75 TABLET ORAL
Status: DISCONTINUED | OUTPATIENT
Start: 2021-03-25 | End: 2021-03-25 | Stop reason: HOSPADM

## 2021-03-24 RX ORDER — GABAPENTIN 300 MG/1
300 CAPSULE ORAL EVERY 12 HOURS SCHEDULED
Status: DISCONTINUED | OUTPATIENT
Start: 2021-03-24 | End: 2021-03-25 | Stop reason: HOSPADM

## 2021-03-24 RX ORDER — DEXMEDETOMIDINE HYDROCHLORIDE 100 UG/ML
INJECTION, SOLUTION INTRAVENOUS AS NEEDED
Status: DISCONTINUED | OUTPATIENT
Start: 2021-03-24 | End: 2021-03-24 | Stop reason: SURG

## 2021-03-24 RX ORDER — LANSOPRAZOLE
30 KIT EVERY MORNING
Status: DISCONTINUED | OUTPATIENT
Start: 2021-03-25 | End: 2021-03-25 | Stop reason: HOSPADM

## 2021-03-24 RX ORDER — PROPOFOL 10 MG/ML
VIAL (ML) INTRAVENOUS AS NEEDED
Status: DISCONTINUED | OUTPATIENT
Start: 2021-03-24 | End: 2021-03-24 | Stop reason: SURG

## 2021-03-24 RX ORDER — SODIUM CHLORIDE 0.9 % (FLUSH) 0.9 %
10 SYRINGE (ML) INJECTION AS NEEDED
Status: DISCONTINUED | OUTPATIENT
Start: 2021-03-24 | End: 2021-03-24 | Stop reason: HOSPADM

## 2021-03-24 RX ORDER — LEVETIRACETAM 100 MG/ML
100 SOLUTION ORAL DAILY
Status: DISCONTINUED | OUTPATIENT
Start: 2021-03-24 | End: 2021-03-25 | Stop reason: HOSPADM

## 2021-03-24 RX ORDER — LACOSAMIDE 200 MG/1
200 TABLET ORAL EVERY 12 HOURS SCHEDULED
Status: DISCONTINUED | OUTPATIENT
Start: 2021-03-24 | End: 2021-03-25 | Stop reason: HOSPADM

## 2021-03-24 RX ORDER — SODIUM CHLORIDE 9 MG/ML
40 INJECTION, SOLUTION INTRAVENOUS AS NEEDED
Status: DISCONTINUED | OUTPATIENT
Start: 2021-03-24 | End: 2021-03-24 | Stop reason: HOSPADM

## 2021-03-24 RX ORDER — LIDOCAINE HYDROCHLORIDE 20 MG/ML
INJECTION, SOLUTION INFILTRATION; PERINEURAL AS NEEDED
Status: DISCONTINUED | OUTPATIENT
Start: 2021-03-24 | End: 2021-03-24 | Stop reason: SURG

## 2021-03-24 RX ORDER — SODIUM CHLORIDE 0.9 % (FLUSH) 0.9 %
10 SYRINGE (ML) INJECTION EVERY 12 HOURS SCHEDULED
Status: DISCONTINUED | OUTPATIENT
Start: 2021-03-24 | End: 2021-03-24 | Stop reason: HOSPADM

## 2021-03-24 RX ORDER — DOCUSATE SODIUM 50 MG/5 ML
50 LIQUID (ML) ORAL DAILY
Status: DISCONTINUED | OUTPATIENT
Start: 2021-03-24 | End: 2021-03-25 | Stop reason: HOSPADM

## 2021-03-24 RX ORDER — SODIUM CHLORIDE, SODIUM LACTATE, POTASSIUM CHLORIDE, CALCIUM CHLORIDE 600; 310; 30; 20 MG/100ML; MG/100ML; MG/100ML; MG/100ML
9 INJECTION, SOLUTION INTRAVENOUS CONTINUOUS
Status: DISCONTINUED | OUTPATIENT
Start: 2021-03-24 | End: 2021-03-25 | Stop reason: HOSPADM

## 2021-03-24 RX ORDER — LIDOCAINE HYDROCHLORIDE 10 MG/ML
0.5 INJECTION, SOLUTION EPIDURAL; INFILTRATION; INTRACAUDAL; PERINEURAL ONCE AS NEEDED
Status: DISCONTINUED | OUTPATIENT
Start: 2021-03-24 | End: 2021-03-24 | Stop reason: HOSPADM

## 2021-03-24 RX ORDER — GLYCOPYRROLATE 0.2 MG/ML
INJECTION INTRAMUSCULAR; INTRAVENOUS AS NEEDED
Status: DISCONTINUED | OUTPATIENT
Start: 2021-03-24 | End: 2021-03-24 | Stop reason: SURG

## 2021-03-24 RX ORDER — LORAZEPAM 0.5 MG/1
0.5 TABLET ORAL EVERY 6 HOURS PRN
Status: DISCONTINUED | OUTPATIENT
Start: 2021-03-24 | End: 2021-03-25 | Stop reason: HOSPADM

## 2021-03-24 RX ADMIN — GABAPENTIN 300 MG: 300 CAPSULE ORAL at 21:59

## 2021-03-24 RX ADMIN — LEVETIRACETAM 100 MG: 500 SOLUTION ORAL at 18:39

## 2021-03-24 RX ADMIN — SODIUM CHLORIDE, POTASSIUM CHLORIDE, SODIUM LACTATE AND CALCIUM CHLORIDE 9 ML/HR: 600; 310; 30; 20 INJECTION, SOLUTION INTRAVENOUS at 14:32

## 2021-03-24 RX ADMIN — LORAZEPAM 0.5 MG: 0.5 TABLET ORAL at 18:38

## 2021-03-24 RX ADMIN — DEXMEDETOMIDINE 4 MCG: 100 INJECTION, SOLUTION, CONCENTRATE INTRAVENOUS at 15:33

## 2021-03-24 RX ADMIN — PROPOFOL 50 MG: 10 INJECTION, EMULSION INTRAVENOUS at 15:26

## 2021-03-24 RX ADMIN — ANASTROZOLE 1 MG: 1 TABLET ORAL at 21:57

## 2021-03-24 RX ADMIN — DOCUSATE SODIUM 50 MG: 50 LIQUID ORAL at 18:39

## 2021-03-24 RX ADMIN — PROPOFOL 10 MG: 10 INJECTION, EMULSION INTRAVENOUS at 15:34

## 2021-03-24 RX ADMIN — GLYCOPYRROLATE 0.4 MG: 0.2 INJECTION INTRAMUSCULAR; INTRAVENOUS at 15:28

## 2021-03-24 RX ADMIN — LIDOCAINE HYDROCHLORIDE 50 MG: 20 INJECTION, SOLUTION INFILTRATION; PERINEURAL at 15:26

## 2021-03-24 RX ADMIN — LACOSAMIDE 200 MG: 200 TABLET, FILM COATED ORAL at 21:59

## 2021-03-24 RX ADMIN — GLYCOPYRROLATE 0.4 MG: 0.2 INJECTION INTRAMUSCULAR; INTRAVENOUS at 15:32

## 2021-03-24 RX ADMIN — PROPOFOL 10 MG: 10 INJECTION, EMULSION INTRAVENOUS at 15:40

## 2021-03-24 NOTE — ANESTHESIA PREPROCEDURE EVALUATION
Anesthesia Evaluation     Patient summary reviewed and Nursing notes reviewed   no history of anesthetic complications:  NPO Solid Status: > 8 hours  NPO Liquid Status: > 8 hours           Airway   Mallampati: II  TM distance: >3 FB  Neck ROM: full  No difficulty expected  Dental      Pulmonary    (+) decreased breath sounds,     ROS comment: H/O Diaphragmatic hernia without obstruction or gangrene  Cardiovascular   Exercise tolerance: poor (<4 METS)    ECG reviewed  Rhythm: regular  Rate: normal      ROS comment: ECG 12 Lead  Order: [id number]  Status:  Final result   Visible to patient:  No (not released) Next appt:  None    Narrative & Impression      HEART RATE= 79  bpm  RR Interval= 760  ms  SD Interval= 188  ms  P Horizontal Axis= 35  deg  P Front Axis= 64  deg  QRSD Interval= 73  ms  QT Interval= 378  ms  QRS Axis= 31  deg  T Wave Axis= 21  deg  - NORMAL ECG -  Sinus rhythm  No change from prior tracing  Electronically Signed By: Katerin Hoffmann ([facility]) 15-Mar-2019 11:04:02  Date and Time of Study: 2019-03-14 11:41:26            Neuro/Psych  (+) seizures (on keppra ), psychiatric history ( Mild intellectual disability) Depression, dementia,       ROS Comment: Anoxic brain injury (CMS/HCC)  GI/Hepatic/Renal/Endo    (+)   thyroid problem hypothyroidism    Musculoskeletal         ROS comment:  Wheelchair bound Possibly secondary to polio as child.   Osteoporosis  Abdominal    Substance History - negative use     OB/GYN          Other      history of cancer ( Malignant neoplasm of central portion of right female breast (CMS/HCC))      Other Comment: Stage II, G3J7wV2, invasive ductal, right, ER/SD positive, HER-2/denia negative                  Anesthesia Plan    ASA 2     MAC     intravenous induction     Anesthetic plan, all risks, benefits, and alternatives have been provided, discussed and informed consent has been obtained with: patient.  Use of blood products discussed with patient  Consented to blood  products.

## 2021-03-24 NOTE — ANESTHESIA POSTPROCEDURE EVALUATION
Patient: Anuja Echols    Procedure Summary     Date: 03/24/21 Room / Location: [facility] ENDOSCOPY 1 /  [facility] OR    Anesthesia Start: 1532 Anesthesia Stop: 1606    Procedure: PERCUTANEOUS ENDOSCOPIC GASTROSTOMY TUBE INSERTION (N/A Abdomen) Diagnosis:       Oropharyngeal dysphagia      Esophageal stricture      Hiatal hernia      Duodenal diverticulum      (Oropharyngeal dysphagia [R13.12])    Surgeons: Dave Howard MD Provider: Jemal Kidd CRNA    Anesthesia Type: MAC ASA Status: 2          Anesthesia Type: MAC    Vitals  Vitals Value Taken Time   /75 03/24/21 1700   Temp 97.3 °F (36.3 °C) 03/24/21 1700   Pulse 67 03/24/21 1700   Resp 16 03/24/21 1700   SpO2 96 % 03/24/21 1700           Post Anesthesia Care and Evaluation    Patient location during evaluation: PHASE II  Patient participation: complete - patient participated  Level of consciousness: awake  Pain management: adequate  Airway patency: patent  Anesthetic complications: No anesthetic complications  PONV Status: none  Cardiovascular status: acceptable  Respiratory status: acceptable  Hydration status: acceptable

## 2021-03-25 ENCOUNTER — TELEPHONE (OUTPATIENT)
Dept: GASTROENTEROLOGY | Facility: CLINIC | Age: 81
End: 2021-03-25

## 2021-03-25 VITALS
HEIGHT: 60 IN | OXYGEN SATURATION: 95 % | WEIGHT: 123.8 LBS | SYSTOLIC BLOOD PRESSURE: 118 MMHG | BODY MASS INDEX: 24.3 KG/M2 | RESPIRATION RATE: 17 BRPM | HEART RATE: 68 BPM | TEMPERATURE: 96.4 F | DIASTOLIC BLOOD PRESSURE: 71 MMHG

## 2021-03-25 PROCEDURE — 63710000001 DOCUSATE SODIUM 150 MG/15ML LIQUID: Performed by: INTERNAL MEDICINE

## 2021-03-25 PROCEDURE — 63710000001 LEVETIRACETAM 100 MG/ML SOLUTION: Performed by: INTERNAL MEDICINE

## 2021-03-25 PROCEDURE — A9270 NON-COVERED ITEM OR SERVICE: HCPCS | Performed by: INTERNAL MEDICINE

## 2021-03-25 PROCEDURE — 99217 PR OBSERVATION CARE DISCHARGE MANAGEMENT: CPT | Performed by: INTERNAL MEDICINE

## 2021-03-25 PROCEDURE — 63710000001: Performed by: INTERNAL MEDICINE

## 2021-03-25 PROCEDURE — G0378 HOSPITAL OBSERVATION PER HR: HCPCS

## 2021-03-25 PROCEDURE — 63710000001 GABAPENTIN 300 MG CAPSULE: Performed by: INTERNAL MEDICINE

## 2021-03-25 PROCEDURE — 63710000001 LANSOPRAZOLE 3 MG/ML SUSPENSION 90 ML BOTTLE: Performed by: INTERNAL MEDICINE

## 2021-03-25 PROCEDURE — 63710000001 ANASTROZOLE 1 MG TABLET: Performed by: INTERNAL MEDICINE

## 2021-03-25 PROCEDURE — 63710000001 LEVOTHYROXINE 75 MCG TABLET: Performed by: INTERNAL MEDICINE

## 2021-03-25 RX ADMIN — GABAPENTIN 300 MG: 300 CAPSULE ORAL at 08:45

## 2021-03-25 RX ADMIN — LACOSAMIDE 200 MG: 200 TABLET, FILM COATED ORAL at 08:45

## 2021-03-25 RX ADMIN — LANSOPRAZOLE 30 MG: KIT at 06:06

## 2021-03-25 RX ADMIN — DOCUSATE SODIUM 50 MG: 50 LIQUID ORAL at 08:45

## 2021-03-25 RX ADMIN — LEVOTHYROXINE SODIUM 75 MCG: 75 TABLET ORAL at 06:06

## 2021-03-25 RX ADMIN — LEVETIRACETAM 100 MG: 500 SOLUTION ORAL at 08:44

## 2021-03-25 RX ADMIN — ANASTROZOLE 1 MG: 1 TABLET ORAL at 08:51

## 2021-03-25 NOTE — TELEPHONE ENCOUNTER
Patient had PEG tube insertion yesterday.  Nursing home asking at residual, when to stop.  Please advise

## 2021-10-07 ENCOUNTER — APPOINTMENT (OUTPATIENT)
Dept: CT IMAGING | Facility: HOSPITAL | Age: 81
End: 2021-10-07

## 2021-10-07 ENCOUNTER — HOSPITAL ENCOUNTER (EMERGENCY)
Facility: HOSPITAL | Age: 81
Discharge: HOME | End: 2021-10-07
Attending: EMERGENCY MEDICINE | Admitting: EMERGENCY MEDICINE

## 2021-10-07 ENCOUNTER — APPOINTMENT (OUTPATIENT)
Dept: GENERAL RADIOLOGY | Facility: HOSPITAL | Age: 81
End: 2021-10-07

## 2021-10-07 VITALS
DIASTOLIC BLOOD PRESSURE: 133 MMHG | WEIGHT: 138.56 LBS | HEART RATE: 88 BPM | BODY MASS INDEX: 26.16 KG/M2 | TEMPERATURE: 100.2 F | OXYGEN SATURATION: 96 % | SYSTOLIC BLOOD PRESSURE: 170 MMHG | RESPIRATION RATE: 20 BRPM | HEIGHT: 61 IN

## 2021-10-07 DIAGNOSIS — D72.829 LEUKOCYTOSIS, UNSPECIFIED TYPE: ICD-10-CM

## 2021-10-07 DIAGNOSIS — Z93.1 GASTROSTOMY TUBE IN PLACE (HCC): Primary | ICD-10-CM

## 2021-10-07 DIAGNOSIS — R10.84 GENERALIZED ABDOMINAL PAIN: ICD-10-CM

## 2021-10-07 LAB
ALBUMIN SERPL-MCNC: 3.1 G/DL (ref 3.5–5.2)
ALBUMIN/GLOB SERPL: 0.7 G/DL
ALP SERPL-CCNC: 186 U/L (ref 39–117)
ALT SERPL W P-5'-P-CCNC: 12 U/L (ref 1–33)
ANION GAP SERPL CALCULATED.3IONS-SCNC: 9.6 MMOL/L (ref 5–15)
AST SERPL-CCNC: 15 U/L (ref 1–32)
BACTERIA UR QL AUTO: ABNORMAL /HPF
BASOPHILS # BLD AUTO: 0.06 10*3/MM3 (ref 0–0.2)
BASOPHILS NFR BLD AUTO: 0.4 % (ref 0–1.5)
BILIRUB SERPL-MCNC: 0.9 MG/DL (ref 0–1.2)
BILIRUB UR QL STRIP: NEGATIVE
BUN SERPL-MCNC: 16 MG/DL (ref 8–23)
BUN/CREAT SERPL: 27.6 (ref 7–25)
CALCIUM SPEC-SCNC: 9.4 MG/DL (ref 8.6–10.5)
CHLORIDE SERPL-SCNC: 88 MMOL/L (ref 98–107)
CLARITY UR: CLEAR
CO2 SERPL-SCNC: 30.4 MMOL/L (ref 22–29)
COLOR UR: YELLOW
CREAT SERPL-MCNC: 0.58 MG/DL (ref 0.57–1)
D-LACTATE SERPL-SCNC: 1.7 MMOL/L (ref 0.5–2)
DEPRECATED RDW RBC AUTO: 48 FL (ref 37–54)
EOSINOPHIL # BLD AUTO: 0.05 10*3/MM3 (ref 0–0.4)
EOSINOPHIL NFR BLD AUTO: 0.3 % (ref 0.3–6.2)
ERYTHROCYTE [DISTWIDTH] IN BLOOD BY AUTOMATED COUNT: 13.7 % (ref 12.3–15.4)
GFR SERPL CREATININE-BSD FRML MDRD: 99 ML/MIN/1.73
GLOBULIN UR ELPH-MCNC: 4.5 GM/DL
GLUCOSE SERPL-MCNC: 136 MG/DL (ref 65–99)
GLUCOSE UR STRIP-MCNC: NEGATIVE MG/DL
HCT VFR BLD AUTO: 42.5 % (ref 34–46.6)
HGB BLD-MCNC: 14 G/DL (ref 12–15.9)
HGB UR QL STRIP.AUTO: ABNORMAL
HYALINE CASTS UR QL AUTO: ABNORMAL /LPF
IMM GRANULOCYTES # BLD AUTO: 0.08 10*3/MM3 (ref 0–0.05)
IMM GRANULOCYTES NFR BLD AUTO: 0.6 % (ref 0–0.5)
KETONES UR QL STRIP: NEGATIVE
LEUKOCYTE ESTERASE UR QL STRIP.AUTO: ABNORMAL
LIPASE SERPL-CCNC: 32 U/L (ref 13–60)
LYMPHOCYTES # BLD AUTO: 2.45 10*3/MM3 (ref 0.7–3.1)
LYMPHOCYTES NFR BLD AUTO: 17.1 % (ref 19.6–45.3)
MCH RBC QN AUTO: 30.8 PG (ref 26.6–33)
MCHC RBC AUTO-ENTMCNC: 32.9 G/DL (ref 31.5–35.7)
MCV RBC AUTO: 93.6 FL (ref 79–97)
MONOCYTES # BLD AUTO: 1.95 10*3/MM3 (ref 0.1–0.9)
MONOCYTES NFR BLD AUTO: 13.6 % (ref 5–12)
NEUTROPHILS NFR BLD AUTO: 68 % (ref 42.7–76)
NEUTROPHILS NFR BLD AUTO: 9.76 10*3/MM3 (ref 1.7–7)
NITRITE UR QL STRIP: NEGATIVE
PH UR STRIP.AUTO: 7 [PH] (ref 4.5–8)
PLATELET # BLD AUTO: 242 10*3/MM3 (ref 140–450)
PMV BLD AUTO: 11.4 FL (ref 6–12)
POTASSIUM SERPL-SCNC: 4.7 MMOL/L (ref 3.5–5.2)
PROCALCITONIN SERPL-MCNC: 0.05 NG/ML (ref 0–0.25)
PROT SERPL-MCNC: 7.6 G/DL (ref 6–8.5)
PROT UR QL STRIP: NEGATIVE
QT INTERVAL: 368 MS
RBC # BLD AUTO: 4.54 10*6/MM3 (ref 3.77–5.28)
RBC # UR: ABNORMAL /HPF
REF LAB TEST METHOD: ABNORMAL
SODIUM SERPL-SCNC: 128 MMOL/L (ref 136–145)
SP GR UR STRIP: 1.01 (ref 1–1.03)
SQUAMOUS #/AREA URNS HPF: ABNORMAL /HPF
TROPONIN T SERPL-MCNC: <0.01 NG/ML (ref 0–0.03)
UROBILINOGEN UR QL STRIP: ABNORMAL
WBC # BLD AUTO: 14.35 10*3/MM3 (ref 3.4–10.8)
WBC UR QL AUTO: ABNORMAL /HPF

## 2021-10-07 PROCEDURE — 25010000002 FENTANYL CITRATE (PF) 50 MCG/ML SOLUTION: Performed by: EMERGENCY MEDICINE

## 2021-10-07 PROCEDURE — 99283 EMERGENCY DEPT VISIT LOW MDM: CPT

## 2021-10-07 PROCEDURE — 71045 X-RAY EXAM CHEST 1 VIEW: CPT

## 2021-10-07 PROCEDURE — 96374 THER/PROPH/DIAG INJ IV PUSH: CPT

## 2021-10-07 PROCEDURE — 84484 ASSAY OF TROPONIN QUANT: CPT | Performed by: EMERGENCY MEDICINE

## 2021-10-07 PROCEDURE — 83605 ASSAY OF LACTIC ACID: CPT | Performed by: EMERGENCY MEDICINE

## 2021-10-07 PROCEDURE — 99284 EMERGENCY DEPT VISIT MOD MDM: CPT

## 2021-10-07 PROCEDURE — 93010 ELECTROCARDIOGRAM REPORT: CPT | Performed by: INTERNAL MEDICINE

## 2021-10-07 PROCEDURE — 99283 EMERGENCY DEPT VISIT LOW MDM: CPT | Performed by: EMERGENCY MEDICINE

## 2021-10-07 PROCEDURE — 80053 COMPREHEN METABOLIC PANEL: CPT | Performed by: EMERGENCY MEDICINE

## 2021-10-07 PROCEDURE — 85025 COMPLETE CBC W/AUTO DIFF WBC: CPT | Performed by: EMERGENCY MEDICINE

## 2021-10-07 PROCEDURE — 83690 ASSAY OF LIPASE: CPT | Performed by: EMERGENCY MEDICINE

## 2021-10-07 PROCEDURE — 81001 URINALYSIS AUTO W/SCOPE: CPT | Performed by: EMERGENCY MEDICINE

## 2021-10-07 PROCEDURE — 84145 PROCALCITONIN (PCT): CPT | Performed by: EMERGENCY MEDICINE

## 2021-10-07 PROCEDURE — P9612 CATHETERIZE FOR URINE SPEC: HCPCS

## 2021-10-07 PROCEDURE — 93005 ELECTROCARDIOGRAM TRACING: CPT | Performed by: EMERGENCY MEDICINE

## 2021-10-07 PROCEDURE — 25010000002 IOPAMIDOL 61 % SOLUTION: Performed by: EMERGENCY MEDICINE

## 2021-10-07 PROCEDURE — 74177 CT ABD & PELVIS W/CONTRAST: CPT

## 2021-10-07 RX ORDER — FENTANYL CITRATE 50 UG/ML
25 INJECTION, SOLUTION INTRAMUSCULAR; INTRAVENOUS ONCE
Status: COMPLETED | OUTPATIENT
Start: 2021-10-07 | End: 2021-10-07

## 2021-10-07 RX ORDER — SODIUM CHLORIDE 0.9 % (FLUSH) 0.9 %
10 SYRINGE (ML) INJECTION AS NEEDED
Status: DISCONTINUED | OUTPATIENT
Start: 2021-10-07 | End: 2021-10-07 | Stop reason: HOSPADM

## 2021-10-07 RX ADMIN — SODIUM CHLORIDE, POTASSIUM CHLORIDE, SODIUM LACTATE AND CALCIUM CHLORIDE 500 ML: 600; 310; 30; 20 INJECTION, SOLUTION INTRAVENOUS at 11:34

## 2021-10-07 RX ADMIN — IOPAMIDOL 100 ML: 612 INJECTION, SOLUTION INTRAVENOUS at 13:25

## 2021-10-07 RX ADMIN — FENTANYL CITRATE 25 MCG: 50 INJECTION INTRAMUSCULAR; INTRAVENOUS at 14:16

## 2021-10-07 RX ADMIN — SODIUM CHLORIDE 500 ML: 9 INJECTION, SOLUTION INTRAVENOUS at 13:33

## 2021-10-07 NOTE — ED PROVIDER NOTES
Subjective   History of Present Illness  81-year-old female  with a history of anoxic brain damage and dementia sent to the ER from her long-term care facility with complaint for concern that her G-tube was out of place.  They said she complained of some abdominal pain so they took x-rays and they were not sure if it was in place or not so they sent her here.  She says her stomach hurts but cannot tell me much more than that.  Review of Systems   Unable to perform ROS: Dementia       Past Medical History:   Diagnosis Date   • Brain damage     Anoxic   • Breast cancer (HCC) 12/2015    Stage II, X4C2eO3, invasive ductal, right, ER/WI positive, HER-2/denia negative.   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       Allergies   Allergen Reactions   • Lactulose    • Megestrol    • Nitrofurantoin        Past Surgical History:   Procedure Laterality Date   • BREAST BIOPSY     • MASTECTOMY Right 2015    Modified radical w/axilary dissection.   • PEG TUBE INSERTION N/A 3/24/2021    Procedure: PERCUTANEOUS ENDOSCOPIC GASTROSTOMY TUBE INSERTION;  Surgeon: Dave Howard MD;  Location: [facility];  Service: Gastroenterology;  Laterality: N/A;  Duodenal diverticulum  Gastritis  Hiatal hernia  Esophagitis  Esophageal stricture       Family History   Problem Relation Age of Onset   • Breast cancer Neg Hx        Social History     Socioeconomic History   • Marital status:      Spouse name: Not on file   • Number of children: Not on file   • Years of education: Not on file   • Highest education level: Not on file   Tobacco Use   • Smoking status: Never Smoker   • Smokeless tobacco: Never Used   Substance and Sexual Activity   • Alcohol use: No   • Drug use: No           Objective    ED  Triage Vitals [10/07/21 1047]   Temp Heart Rate Resp BP SpO2   98 °F (36.7 °C) 78 22 (!) 129/101 96 %      Temp src Heart Rate Source Patient Position BP Location FiO2 (%)   Temporal Monitor Lying Right arm --       Physical Exam  INITIAL VITAL SIGNS: Reviewed by me.  Pulse ox normal  GENERAL: Alert and interactive. No acute distress.  HEAD: Head is normocephalic.  EYES: EOMI. PERRL. No scleral icterus. No conjunctival injection.  ENT: Moist mucous membranes.   NECK: Supple. Full range of motion.  RESPIRATORY: No tachypnea. Clear breath sounds bilaterally. No wheezing. No rales. No rhonchi.  CV: Regular rate and rhythm. No murmurs. No rubs or gallops.  ABDOMEN: Soft, nondistended, mildly tender to palpation in the mid abdomen, no rebound or guarding  EXTREMITIES: No deformity. No clubbing or cyanosis. No edema.   SKIN: Warm and dry. No diaphoresis. No obvious rashes.   NEUROLOGIC: Alert, disoriented  Results for orders placed or performed during the hospital encounter of 10/07/21   Comprehensive Metabolic Panel    Specimen: Blood   Result Value Ref Range    Glucose 136 (H) 65 - 99 mg/dL    BUN 16 8 - 23 mg/dL    Creatinine 0.58 0.57 - 1.00 mg/dL    Sodium 128 (L) 136 - 145 mmol/L    Potassium 4.7 3.5 - 5.2 mmol/L    Chloride 88 (L) 98 - 107 mmol/L    CO2 30.4 (H) 22.0 - 29.0 mmol/L    Calcium 9.4 8.6 - 10.5 mg/dL    Total Protein 7.6 6.0 - 8.5 g/dL    Albumin 3.10 (L) 3.50 - 5.20 g/dL    ALT (SGPT) 12 1 - 33 U/L    AST (SGOT) 15 1 - 32 U/L    Alkaline Phosphatase 186 (H) 39 - 117 U/L    Total Bilirubin 0.9 0.0 - 1.2 mg/dL    eGFR Non African Amer 99 >60 mL/min/1.73    Globulin 4.5 gm/dL    A/G Ratio 0.7 g/dL    BUN/Creatinine Ratio 27.6 (H) 7.0 - 25.0    Anion Gap 9.6 5.0 - 15.0 mmol/L   Lipase    Specimen: Blood   Result Value Ref Range    Lipase 32 13 - 60 U/L   Urinalysis With Microscopic If Indicated (No Culture) - Urine, Catheter    Specimen: Urine, Catheter   Result Value Ref Range    Color, UA Yellow Yellow,  Straw    Appearance, UA Clear Clear    pH, UA 7.0 4.5 - 8.0    Specific Gravity, UA 1.015 1.003 - 1.030    Glucose, UA Negative Negative    Ketones, UA Negative Negative    Bilirubin, UA Negative Negative    Blood, UA Moderate (2+) (A) Negative    Protein, UA Negative Negative    Leuk Esterase, UA Trace (A) Negative    Nitrite, UA Negative Negative    Urobilinogen, UA 1.0 E.U./dL 0.2 - 1.0 E.U./dL   Lactic Acid, Plasma    Specimen: Blood   Result Value Ref Range    Lactate 1.7 0.5 - 2.0 mmol/L   Procalcitonin    Specimen: Blood   Result Value Ref Range    Procalcitonin 0.05 0.00 - 0.25 ng/mL   CBC Auto Differential    Specimen: Blood   Result Value Ref Range    WBC 14.35 (H) 3.40 - 10.80 10*3/mm3    RBC 4.54 3.77 - 5.28 10*6/mm3    Hemoglobin 14.0 12.0 - 15.9 g/dL    Hematocrit 42.5 34.0 - 46.6 %    MCV 93.6 79.0 - 97.0 fL    MCH 30.8 26.6 - 33.0 pg    MCHC 32.9 31.5 - 35.7 g/dL    RDW 13.7 12.3 - 15.4 %    RDW-SD 48.0 37.0 - 54.0 fl    MPV 11.4 6.0 - 12.0 fL    Platelets 242 140 - 450 10*3/mm3    Neutrophil % 68.0 42.7 - 76.0 %    Lymphocyte % 17.1 (L) 19.6 - 45.3 %    Monocyte % 13.6 (H) 5.0 - 12.0 %    Eosinophil % 0.3 0.3 - 6.2 %    Basophil % 0.4 0.0 - 1.5 %    Immature Grans % 0.6 (H) 0.0 - 0.5 %    Neutrophils, Absolute 9.76 (H) 1.70 - 7.00 10*3/mm3    Lymphocytes, Absolute 2.45 0.70 - 3.10 10*3/mm3    Monocytes, Absolute 1.95 (H) 0.10 - 0.90 10*3/mm3    Eosinophils, Absolute 0.05 0.00 - 0.40 10*3/mm3    Basophils, Absolute 0.06 0.00 - 0.20 10*3/mm3    Immature Grans, Absolute 0.08 (H) 0.00 - 0.05 10*3/mm3   Urinalysis, Microscopic Only - Urine, Catheter    Specimen: Urine, Catheter   Result Value Ref Range    RBC, UA 13-20 (A) None Seen /HPF    WBC, UA 0-2 (A) None Seen /HPF    Bacteria, UA None Seen None Seen /HPF    Squamous Epithelial Cells, UA None Seen None Seen, 0-2 /HPF    Hyaline Casts, UA None Seen None Seen /LPF    Methodology Manual Light Microscopy    Troponin    Specimen: Blood   Result Value  Ref Range    Troponin T <0.010 0.000 - 0.030 ng/mL   ECG 12 Lead   Result Value Ref Range    QT Interval 368 ms     CT Abdomen Pelvis With Contrast    Result Date: 10/7/2021  CT Abdomen Pelvis W INDICATION: Generalized abdominal pain. Diminished functional status/dementia. Feeding tube in place. In the emergency Department. TECHNIQUE: CT of the abdomen and pelvis with IV contrast. Coronal and sagittal reconstructions were obtained.  Radiation dose reduction techniques included automated exposure control or exposure modulation based on body size. Count of known CT and cardiac nuc med studies performed in previous 12 months: 0. COMPARISON: None available. FINDINGS: Abdomen: Included lung bases demonstrates probable dependent atelectasis. There may be some component of fibrosis in the lower lobes. There is more confluent fibrotic change in the lingula with dystrophic calcification. No effusion. There is a large hiatal hernia and partial intrathoracic stomach. Normal caliber aorta and atherosclerotic change. There is a G-tube in the stomach. The spleen and adrenal glands are negative and the pancreas is unremarkable. Gallbladder distended with multiple gallstones. There is no biliary ductal dilatation. No distinct CT evidence of acute cholecystitis at this time. Negative liver. The kidneys are nonobstructed. No suspicious renal lesion. No adenopathy. Pelvis: No drainable fluid collection or adnexal mass. The bladder demonstrates a small diverticulum on the left. Probable layering excreted contrast within the diverticulum. Layering contrast versus numerous small stones layering dependently within the bladder. There are punctate calcifications of the bladder wall on the right without evidence of associated bladder mass. There is mild wall thickening of the rectum without associated inflammatory changes to suggest proctitis. There is no evidence of bowel obstruction. Appendix not identified but no secondary signs of  appendicitis. No drainable fluid collection elsewhere in the abdomen or pelvis or evidence of free air. Inguinal canals are negative. There is no suspicious bone lesion. There are degenerative changes. Vertebral body hemangioma in the lower thoracic spine.  negative.     1. No clearly acute process in the abdomen pelvis.  Appendix not identified but no secondary signs of appendicitis. 2. Gallbladder distention and cholelithiasis without CT evidence of acute cholecystitis. No biliary ductal dilatation. 3. Nonspecific mild wall thickening of the rectum. No associated inflammatory change. Correlate clinically for mild proctitis however. 4. Large hiatal hernia and partial intrathoracic stomach with bibasilar atelectasis and fibrosis. 5. G-tube present in the stomach. 6. Bladder diverticulum containing excreted contrast material. Dependent excreted contrast material within the remainder of the bladder although tiny gravel-like stones are favored. Presence or absence of bladder stones could be better assessed with ultrasound or cystoscopy if clinically indicated. Signer Name: Scott Bentley MD  Signed: 10/7/2021 2:47 PM  Workstation Name: [id number]  [facility]    XR Chest 1 View    Result Date: 10/7/2021  CHEST X-RAY, 10/07/2021     HISTORY: 81-year-old female in the ED with generalized abdomen pain. Fever today. Dementia with decreasing functional status. History of breast cancer.  TECHNIQUE: AP portable chest x-ray.  COMPARISON: *  Lung base images from CT abdomen today. Chest x-ray, 03/14/2019.  FINDINGS: Lung volumes are chronically low, and there is chronic pulmonary and pleural scarring in the lung bases, greater on the left. These findings are unchanged. Mid and upper lungs are clear. No visible pulmonary infiltrate or pleural effusion. Mild cardiomegaly is stable, and pulmonary vascularity is normal. Surgical clips in the right axilla.      No active disease.  This report was finalized  on 10/7/2021 3:21 PM by Dr. Henri Dickson MD.        Procedures    EKG           EKG time/Interp time: 1405/1408  Rhythm/Rate: Sinus rhythm rate of 89  P waves and CO: First-degree AV block  QRS, axis: Normal QRS duration with normal axis  ST and T waves: No ST elevations.  No changes in EKG compared to prior except for the prolonged CO interval    Independently interpreted by me contemporaneously with treatment    Independently interpreted by me contemporaneously with treatment           ED Course  ED Course as of Oct 07 1529   Thu Oct 07, 2021   1121 81-year-old dementia and anoxic brain injury patient complains of abdominal pain.  She might be slightly distended oral temp at triage was normal she feels warm to the touch will get a rectal temp.  Also get labs and a CT scan of the abdomen.  The G-tube appears to be in place but the CT scan was from getting to evaluate for other abdominal pathology will show this as well.    [RO]   1405 Patient appears more comfortable than she initially did when I asked her where her pain is she is pointing to her upper abdomen, will get an EKG to ensure no evidence of STEMI.    [RO]   1500 Labs show slightly low sodium and chloride but she has had half a liter of LR and 1/2 L of normal saline.  Troponin is negative pro-Robi is negative lipase normal lactates normal she does have white blood cell count of 14 without increase in neutrophils.  On exam she did not have a surgical abdomen and CT scan shows no acute process in the abdomen EKG was without ischemia troponin was negative she is resting comfortably.  As her G-tube potentially being out of place is the reason they sent her here and it is in place and there are no acute findings I think she can be safely discharged home back to the nursing home only waiting on chest x-ray read     [RO]      ED Course User Index  [RO] Gino Ruiz MD                                           [facility]    Final diagnoses:   Gastrostomy tube in  place (HCC)   Leukocytosis, unspecified type   Generalized abdominal pain       ED Disposition  ED Disposition     ED Disposition Condition Comment    Discharge Good           Josefa Mcknight DO  [address] [address]  [phone]    Call   To schedule follow-up appointment         Medication List      No changes were made to your prescriptions during this visit.          Gino Ruiz MD  10/07/21 2823

## 2021-10-07 NOTE — DISCHARGE INSTRUCTIONS
Patient's feeding tube is in place.  Return to the emergency room if there is worsening symptoms or for any other concerns.

## 2021-10-13 DIAGNOSIS — C50.111 MALIGNANT NEOPLASM OF CENTRAL PORTION OF RIGHT FEMALE BREAST, UNSPECIFIED ESTROGEN RECEPTOR STATUS (HCC): ICD-10-CM

## 2021-10-13 DIAGNOSIS — Z12.31 ENCOUNTER FOR SCREENING MAMMOGRAM FOR MALIGNANT NEOPLASM OF BREAST: ICD-10-CM

## 2021-10-13 DIAGNOSIS — Z12.39 BREAST CANCER SCREENING, HIGH RISK PATIENT: Primary | ICD-10-CM

## 2021-10-19 ENCOUNTER — TELEPHONE (OUTPATIENT)
Dept: SURGERY | Facility: CLINIC | Age: 81
End: 2021-10-19

## 2022-01-25 ENCOUNTER — HOSPITAL ENCOUNTER (EMERGENCY)
Facility: HOSPITAL | Age: 82
Discharge: NURSING, REHAB OR LONG-TERM CARE | End: 2022-01-25
Attending: EMERGENCY MEDICINE | Admitting: EMERGENCY MEDICINE

## 2022-01-25 ENCOUNTER — APPOINTMENT (OUTPATIENT)
Dept: GENERAL RADIOLOGY | Facility: HOSPITAL | Age: 82
End: 2022-01-25

## 2022-01-25 VITALS
HEIGHT: 64 IN | OXYGEN SATURATION: 94 % | SYSTOLIC BLOOD PRESSURE: 198 MMHG | TEMPERATURE: 98.4 F | DIASTOLIC BLOOD PRESSURE: 94 MMHG | RESPIRATION RATE: 28 BRPM | BODY MASS INDEX: 22.53 KG/M2 | HEART RATE: 72 BPM | WEIGHT: 132 LBS

## 2022-01-25 DIAGNOSIS — I44.7 LEFT BUNDLE BRANCH BLOCK: ICD-10-CM

## 2022-01-25 DIAGNOSIS — E87.1 HYPONATREMIA: ICD-10-CM

## 2022-01-25 DIAGNOSIS — J18.9 PNEUMONIA OF LEFT LOWER LOBE DUE TO INFECTIOUS ORGANISM: Primary | ICD-10-CM

## 2022-01-25 LAB
ALBUMIN SERPL-MCNC: 3.5 G/DL (ref 3.5–5.2)
ALBUMIN/GLOB SERPL: 0.7 G/DL
ALP SERPL-CCNC: 275 U/L (ref 39–117)
ALT SERPL W P-5'-P-CCNC: 16 U/L (ref 1–33)
ANION GAP SERPL CALCULATED.3IONS-SCNC: 11.6 MMOL/L (ref 5–15)
AST SERPL-CCNC: 18 U/L (ref 1–32)
BASOPHILS # BLD AUTO: 0.07 10*3/MM3 (ref 0–0.2)
BASOPHILS NFR BLD AUTO: 0.6 % (ref 0–1.5)
BILIRUB SERPL-MCNC: 0.8 MG/DL (ref 0–1.2)
BUN SERPL-MCNC: 15 MG/DL (ref 8–23)
BUN/CREAT SERPL: 24.2 (ref 7–25)
CALCIUM SPEC-SCNC: 9.6 MG/DL (ref 8.6–10.5)
CHLORIDE SERPL-SCNC: 85 MMOL/L (ref 98–107)
CO2 SERPL-SCNC: 28.4 MMOL/L (ref 22–29)
CREAT SERPL-MCNC: 0.62 MG/DL (ref 0.57–1)
D-LACTATE SERPL-SCNC: 2.5 MMOL/L (ref 0.5–2)
DEPRECATED RDW RBC AUTO: 48.6 FL (ref 37–54)
EOSINOPHIL # BLD AUTO: 0.01 10*3/MM3 (ref 0–0.4)
EOSINOPHIL NFR BLD AUTO: 0.1 % (ref 0.3–6.2)
ERYTHROCYTE [DISTWIDTH] IN BLOOD BY AUTOMATED COUNT: 14.1 % (ref 12.3–15.4)
FERRITIN SERPL-MCNC: 244 NG/ML (ref 13–150)
FLUAV RNA RESP QL NAA+PROBE: NOT DETECTED
FLUBV RNA RESP QL NAA+PROBE: NOT DETECTED
GFR SERPL CREATININE-BSD FRML MDRD: 92 ML/MIN/1.73
GLOBULIN UR ELPH-MCNC: 4.8 GM/DL
GLUCOSE SERPL-MCNC: 204 MG/DL (ref 65–99)
HCT VFR BLD AUTO: 43.8 % (ref 34–46.6)
HGB BLD-MCNC: 14.5 G/DL (ref 12–15.9)
IMM GRANULOCYTES # BLD AUTO: 0.12 10*3/MM3 (ref 0–0.05)
IMM GRANULOCYTES NFR BLD AUTO: 1 % (ref 0–0.5)
LDH SERPL-CCNC: 217 U/L (ref 135–214)
LYMPHOCYTES # BLD AUTO: 1.33 10*3/MM3 (ref 0.7–3.1)
LYMPHOCYTES NFR BLD AUTO: 10.8 % (ref 19.6–45.3)
MCH RBC QN AUTO: 31.3 PG (ref 26.6–33)
MCHC RBC AUTO-ENTMCNC: 33.1 G/DL (ref 31.5–35.7)
MCV RBC AUTO: 94.6 FL (ref 79–97)
MONOCYTES # BLD AUTO: 1.52 10*3/MM3 (ref 0.1–0.9)
MONOCYTES NFR BLD AUTO: 12.4 % (ref 5–12)
NEUTROPHILS NFR BLD AUTO: 75.1 % (ref 42.7–76)
NEUTROPHILS NFR BLD AUTO: 9.22 10*3/MM3 (ref 1.7–7)
NRBC BLD AUTO-RTO: 0 /100 WBC (ref 0–0.2)
NT-PROBNP SERPL-MCNC: 323.5 PG/ML (ref 0–1800)
PLATELET # BLD AUTO: 238 10*3/MM3 (ref 140–450)
PMV BLD AUTO: 11.5 FL (ref 6–12)
POTASSIUM SERPL-SCNC: 4.7 MMOL/L (ref 3.5–5.2)
PROCALCITONIN SERPL-MCNC: 0.06 NG/ML (ref 0–0.25)
PROT SERPL-MCNC: 8.3 G/DL (ref 6–8.5)
QT INTERVAL: 470 MS
RBC # BLD AUTO: 4.63 10*6/MM3 (ref 3.77–5.28)
SARS-COV-2 RNA RESP QL NAA+PROBE: NOT DETECTED
SODIUM SERPL-SCNC: 125 MMOL/L (ref 136–145)
TROPONIN T SERPL-MCNC: <0.01 NG/ML (ref 0–0.03)
WBC NRBC COR # BLD: 12.27 10*3/MM3 (ref 3.4–10.8)

## 2022-01-25 PROCEDURE — 83615 LACTATE (LD) (LDH) ENZYME: CPT | Performed by: EMERGENCY MEDICINE

## 2022-01-25 PROCEDURE — 87040 BLOOD CULTURE FOR BACTERIA: CPT | Performed by: EMERGENCY MEDICINE

## 2022-01-25 PROCEDURE — 99284 EMERGENCY DEPT VISIT MOD MDM: CPT

## 2022-01-25 PROCEDURE — 84484 ASSAY OF TROPONIN QUANT: CPT | Performed by: EMERGENCY MEDICINE

## 2022-01-25 PROCEDURE — 87636 SARSCOV2 & INF A&B AMP PRB: CPT | Performed by: EMERGENCY MEDICINE

## 2022-01-25 PROCEDURE — 71045 X-RAY EXAM CHEST 1 VIEW: CPT

## 2022-01-25 PROCEDURE — 99285 EMERGENCY DEPT VISIT HI MDM: CPT | Performed by: EMERGENCY MEDICINE

## 2022-01-25 PROCEDURE — 83605 ASSAY OF LACTIC ACID: CPT | Performed by: EMERGENCY MEDICINE

## 2022-01-25 PROCEDURE — 80053 COMPREHEN METABOLIC PANEL: CPT | Performed by: EMERGENCY MEDICINE

## 2022-01-25 PROCEDURE — 85025 COMPLETE CBC W/AUTO DIFF WBC: CPT | Performed by: EMERGENCY MEDICINE

## 2022-01-25 PROCEDURE — 83880 ASSAY OF NATRIURETIC PEPTIDE: CPT | Performed by: EMERGENCY MEDICINE

## 2022-01-25 PROCEDURE — 93005 ELECTROCARDIOGRAM TRACING: CPT | Performed by: EMERGENCY MEDICINE

## 2022-01-25 PROCEDURE — 82728 ASSAY OF FERRITIN: CPT | Performed by: EMERGENCY MEDICINE

## 2022-01-25 PROCEDURE — 84145 PROCALCITONIN (PCT): CPT | Performed by: EMERGENCY MEDICINE

## 2022-01-25 PROCEDURE — 93010 ELECTROCARDIOGRAM REPORT: CPT | Performed by: INTERNAL MEDICINE

## 2022-01-25 RX ORDER — LEVOFLOXACIN 500 MG/1
500 TABLET, FILM COATED ORAL DAILY
Qty: 10 TABLET | Refills: 0 | Status: SHIPPED | OUTPATIENT
Start: 2022-01-25 | End: 2022-02-04

## 2022-01-25 RX ORDER — LEVOFLOXACIN 500 MG/1
500 TABLET, FILM COATED ORAL ONCE
Status: COMPLETED | OUTPATIENT
Start: 2022-01-25 | End: 2022-01-25

## 2022-01-25 RX ORDER — SODIUM CHLORIDE 0.9 % (FLUSH) 0.9 %
10 SYRINGE (ML) INJECTION AS NEEDED
Status: DISCONTINUED | OUTPATIENT
Start: 2022-01-25 | End: 2022-01-25 | Stop reason: HOSPADM

## 2022-01-25 RX ADMIN — LEVOFLOXACIN 500 MG: 500 TABLET, FILM COATED ORAL at 13:05

## 2022-01-25 NOTE — ED PROVIDER NOTES
"Subjective     History provided by:  Nursing home and EMS personnel    History of Present Illness    · Chief complaint: Shortness of breath    · Location: At the nursing home    · Quality/Severity: The nursing home reported she was short of breath    · Timing/Onset: Presumptively today.    · Modifying Factors: None known.    · Associated symptoms: None known.    · Narrative: The patient is a 81-year-old white female sent from [facility] for respiratory difficulty.  EMS reports that he the nursing home had applied a facemask with 2 L of oxygen running to it with a pulse ox in the 90s.  The patient is nonverbal and unable to elaborate further on the history of the present illness.  She has anoxic brain injury and dementia and receives nutrients via a G-tube.  She is a DNR.    Review of Systems   Reason unable to perform ROS: The patient is nonverbal.     Past Medical History:   Diagnosis Date   • Brain damage     Anoxic   • Breast cancer (HCC) 12/2015    Stage II, B0S0sC3, invasive ductal, right, ER/LA positive, HER-2/denia negative.   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     BP (!) 198/94 (BP Location: Right arm, Patient Position: Lying)   Pulse 72   Temp 98.4 °F (36.9 °C) (Oral)   Resp 28   Ht 162.6 cm (64\")   Wt 59.9 kg (132 lb)   SpO2 94%   BMI 22.66 kg/m²     Past Medical History:   Diagnosis Date   • Brain damage     Anoxic   • Breast cancer (HCC) 12/2015    Stage II, U2O4dH5, invasive ductal, right, ER/LA positive, HER-2/denia negative.   • Dementia (HCC)    • Depression    • Disease of thyroid gland     Hypothyroid   • H/O Diaphragmatic hernia without obstruction or gangrene    • Mild intellectual disability    • Osteoporosis    • Seizures (HCC)    • Wheelchair " bound     Possibly secondary to polio as child.       Allergies   Allergen Reactions   • Lactulose    • Megestrol    • Nitrofurantoin        Past Surgical History:   Procedure Laterality Date   • BREAST BIOPSY     • MASTECTOMY Right 2015    Modified radical w/axilary dissection.   • PEG TUBE INSERTION N/A 3/24/2021    Procedure: PERCUTANEOUS ENDOSCOPIC GASTROSTOMY TUBE INSERTION;  Surgeon: Dave Howard MD;  Location: [facility];  Service: Gastroenterology;  Laterality: N/A;  Duodenal diverticulum  Gastritis  Hiatal hernia  Esophagitis  Esophageal stricture       Family History   Problem Relation Age of Onset   • Breast cancer Neg Hx        Social History     Socioeconomic History   • Marital status:    Tobacco Use   • Smoking status: Never Smoker   • Smokeless tobacco: Never Used   Substance and Sexual Activity   • Alcohol use: No   • Drug use: No           Objective   Physical Exam  Vitals and nursing note reviewed.   Constitutional:       Comments: The patient appears chronically ill.  She does not appear in acute distress.  Review of her vital signs: She is afebrile with a temperature 98.4, her initial respiratory rate was recorded as 40 however she dropped to 28 without any interventions.  Her initial pulse oximetry on 2 L was 96% but when the oxygen was stopped her room air pulse oximetry was 94%.  Heart rate normal 82, blood pressure elevated 198/94.   HENT:      Head: Normocephalic and atraumatic.      Mouth/Throat:      Mouth: Mucous membranes are moist.      Pharynx: Oropharynx is clear.   Cardiovascular:      Rate and Rhythm: Normal rate and regular rhythm.      Heart sounds: No murmur heard.      Pulmonary:      Effort: Pulmonary effort is normal.      Breath sounds: Rhonchi present. No decreased breath sounds, wheezing or rales.   Chest:      Chest wall: No tenderness.   Abdominal:      General: Bowel sounds are normal.      Palpations: Abdomen is soft.      Tenderness: There is no  abdominal tenderness.   Musculoskeletal:      Cervical back: Normal range of motion and neck supple.      Right lower leg: No tenderness. No edema.      Left lower leg: No tenderness. No edema.      Comments: Disuse atrophy of both lower legs.   Lymphadenopathy:      Cervical: No cervical adenopathy.   Skin:     General: Skin is warm and dry.      Capillary Refill: Capillary refill takes less than 2 seconds.      Coloration: Skin is not cyanotic or pale.      Findings: No erythema.   Neurological:      Comments: The patient is alert.  She does not attempt to follow commands.         Procedures           ED Course  ED Course as of 01/25/22 1542   Tue Jan 25, 2022   1051 My interpretation of the patient's EKG tracing performed 10: 29 is normal sinus rhythm with a rate of 79, wide QRS complex due to left bundle branch block which is new in comparison to tracing 10/7/2021.  Appropriate discordant ST segment changes, normal axis. [TP]   1539 Review the patient's laboratory studies: Her CBC has a slightly elevated white count of 12.27 with a slight left shift.  Hemoglobin, hematocrit and platelets within normal limits.  CMP had a low sodium of 125 with a normal potassium of 4.7 and an elevated blood glucose of 204.  Her CO2 was normal at 28.4.  Alk phos elevated at 275, remainder of LFTs within normal limits.  Her lactic acid was elevated mildly at 2.5 but she had a normal procalcitonin.  proBNP within normal limits.  Cardiac troponin within normal limits.  COVID-19 and influenza PCR's were negative.  The chest x-ray was interpreted by me and the radiologist as probable left lower lobe pneumonia. [TP]   1541 The patient is not hypoxic.  She appears stable to be treated with antibiotics per her G-tube.  She was administered Levaquin 500 mg per G-tube and will be prescribed a 10-day course of the same. [TP]      ED Course User Index  [TP] Chandra Seaman MD                                                 MDM  Number of  Diagnoses or Management Options  Hyponatremia: new and requires workup  Left bundle branch block: new and requires workup  Pneumonia of left lower lobe due to infectious organism: new and requires workup     Amount and/or Complexity of Data Reviewed  Clinical lab tests: ordered and reviewed  Tests in the radiology section of CPT®: ordered and reviewed  Tests in the medicine section of CPT®: ordered and reviewed    Risk of Complications, Morbidity, and/or Mortality  Presenting problems: high  Diagnostic procedures: high  Management options: high  General comments: My differential diagnosis for dyspnea includes but is not limited to:  Asthma, COPD, pneumonia, pulmonary embolus, acute respiratory distress syndrome, pneumothorax, pleural effusion, pulmonary fibrosis, congestive heart failure, myocardial infarction, DKA, uremia, acidosis, sepsis, anemia, drug related, hyperventilation, CNS disease, COVID-19    Patient Progress  Patient progress: stable      Final diagnoses:   Pneumonia of left lower lobe due to infectious organism   Left bundle branch block   Hyponatremia       ED Disposition  ED Disposition     ED Disposition Condition Comment    Discharge            Josefa Mcknight DO  [address] [id number] [phone]    Schedule an appointment as soon as possible for a visit in 1 week           Medication List      New Prescriptions    levoFLOXacin 500 MG tablet  Commonly known as: LEVAQUIN  Take 1 tablet by mouth Daily for 10 days. Per the G-tube           Where to Get Your Medications      You can get these medications from any pharmacy    Bring a paper prescription for each of these medications  · levoFLOXacin 500 MG tablet         Labs Reviewed   COMPREHENSIVE METABOLIC PANEL - Abnormal; Notable for the following components:       Result Value    Glucose 204 (*)     Sodium 125 (*)     Chloride 85 (*)     Alkaline Phosphatase 275 (*)     All other components within normal  limits    Narrative:     GFR Normal >60  Chronic Kidney Disease <60  Kidney Failure <15     LACTATE DEHYDROGENASE - Abnormal; Notable for the following components:     (*)     All other components within normal limits   LACTIC ACID, PLASMA - Abnormal; Notable for the following components:    Lactate 2.5 (*)     All other components within normal limits   FERRITIN - Abnormal; Notable for the following components:    Ferritin 244.00 (*)     All other components within normal limits    Narrative:     Results may be falsely decreased if patient taking Biotin.     CBC WITH AUTO DIFFERENTIAL - Abnormal; Notable for the following components:    WBC 12.27 (*)     Lymphocyte % 10.8 (*)     Monocyte % 12.4 (*)     Eosinophil % 0.1 (*)     Immature Grans % 1.0 (*)     Neutrophils, Absolute 9.22 (*)     Monocytes, Absolute 1.52 (*)     Immature Grans, Absolute 0.12 (*)     All other components within normal limits   COVID-19 AND FLU A/B, NP SWAB IN TRANSPORT MEDIA 8-12 HR TAT - Normal    Narrative:     Fact sheet for providers: https://www.fda.gov/media/161600/download    Fact sheet for patients: https://www.fda.gov/media/472909/download    Test performed by PCR.   PROCALCITONIN - Normal   BNP (IN-HOUSE) - Normal    Narrative:     Among patients with dyspnea, NT-proBNP is highly sensitive for the detection of acute congestive heart failure. In addition NT-proBNP of <300 pg/ml effectively rules out acute congestive heart failure with 99% negative predictive value.    Results may be falsely decreased if patient taking Biotin.     TROPONIN (IN-HOUSE) - Normal    Narrative:     Troponin T Reference Range:  <= 0.03 ng/mL-   Negative for AMI  >0.03 ng/mL-     Abnormal for myocardial necrosis.  Clinicians would have to utilize clinical acumen, EKG, Troponin and serial changes to determine if it is an Acute Myocardial Infarction or myocardial injury due to an underlying chronic condition.       Results may be falsely decreased  if patient taking Biotin.     BLOOD CULTURE   BLOOD CULTURE   LACTIC ACID, REFLEX   CBC AND DIFFERENTIAL    Narrative:     The following orders were created for panel order CBC & Differential.  Procedure                               Abnormality         Status                     ---------                               -----------         ------                     CBC Auto Differential[[id number]]        Abnormal            Final result                 Please view results for these tests on the individual orders.     XR Chest AP   Final Result       1. Probable chronic left basilar atelectasis or potentially left lower   lobe pneumonia in the appropriate clinical context although the   appearance radiographically is similar to the prior study for technical   factors.   2. Cardiomegaly without volume overload. No new right-sided opacities.       This report was finalized on 1/25/2022 12:07 PM by Dr. Scott Bentley MD.                 Medication List      New Prescriptions    levoFLOXacin 500 MG tablet  Commonly known as: LEVAQUIN  Take 1 tablet by mouth Daily for 10 days. Per the G-tube           Where to Get Your Medications      You can get these medications from any pharmacy    Bring a paper prescription for each of these medications  · levoFLOXacin 500 MG tablet              Chandra Seaman MD  01/25/22 8008

## 2022-01-30 LAB
BACTERIA SPEC AEROBE CULT: NORMAL
BACTERIA SPEC AEROBE CULT: NORMAL

## 2022-02-21 ENCOUNTER — TELEPHONE (OUTPATIENT)
Dept: GASTROENTEROLOGY | Facility: CLINIC | Age: 82
End: 2022-02-21

## 2022-02-21 NOTE — TELEPHONE ENCOUNTER
Per Dr. Howard, I advised Ricarda to clean tube with coca cola and an endo brush and no powder meds (need to be liquid).  She states 2 of patient's meds do not have liquid alternatives.  Per Dr. Howard, I advised that some patients can still tolerate limited PO, and to try putting these meds in applesauce to administer.  In the end, if none of these solutions works, they need to take patient to ER for eval and may end up with  possible tube replacement.    ----- Message from Sariah Mitchell sent at 2/21/2022  1:02 PM EST -----  Regarding: Lee patient  Contact: [phone]  Patients g-tube is not working properly that Lee placed. Keeps getting clogged. Needs to be worked in if possible. Call Ricarda.

## 2022-02-27 ENCOUNTER — APPOINTMENT (OUTPATIENT)
Dept: GENERAL RADIOLOGY | Facility: HOSPITAL | Age: 82
End: 2022-02-27

## 2022-02-27 ENCOUNTER — HOSPITAL ENCOUNTER (EMERGENCY)
Facility: HOSPITAL | Age: 82
Discharge: HOME | End: 2022-02-27
Attending: EMERGENCY MEDICINE | Admitting: EMERGENCY MEDICINE

## 2022-02-27 VITALS
WEIGHT: 153 LBS | OXYGEN SATURATION: 93 % | HEIGHT: 62 IN | BODY MASS INDEX: 28.16 KG/M2 | HEART RATE: 72 BPM | RESPIRATION RATE: 18 BRPM | TEMPERATURE: 100.1 F | SYSTOLIC BLOOD PRESSURE: 187 MMHG | DIASTOLIC BLOOD PRESSURE: 105 MMHG

## 2022-02-27 DIAGNOSIS — N39.0 ACUTE UTI: ICD-10-CM

## 2022-02-27 DIAGNOSIS — R56.9 SEIZURE: ICD-10-CM

## 2022-02-27 DIAGNOSIS — K94.23 PEG TUBE MALFUNCTION: Primary | ICD-10-CM

## 2022-02-27 LAB
ALBUMIN SERPL-MCNC: 3.8 G/DL (ref 3.5–5.2)
ALBUMIN/GLOB SERPL: 0.8 G/DL
ALP SERPL-CCNC: 201 U/L (ref 39–117)
ALT SERPL W P-5'-P-CCNC: 23 U/L (ref 1–33)
ANION GAP SERPL CALCULATED.3IONS-SCNC: 22.1 MMOL/L (ref 5–15)
AST SERPL-CCNC: 28 U/L (ref 1–32)
BACTERIA UR QL AUTO: ABNORMAL /HPF
BASOPHILS # BLD AUTO: 0.05 10*3/MM3 (ref 0–0.2)
BASOPHILS NFR BLD AUTO: 0.3 % (ref 0–1.5)
BILIRUB SERPL-MCNC: 0.6 MG/DL (ref 0–1.2)
BILIRUB UR QL STRIP: NEGATIVE
BUN SERPL-MCNC: 14 MG/DL (ref 8–23)
BUN/CREAT SERPL: 17.9 (ref 7–25)
CALCIUM SPEC-SCNC: 9.7 MG/DL (ref 8.6–10.5)
CHLORIDE SERPL-SCNC: 88 MMOL/L (ref 98–107)
CLARITY UR: ABNORMAL
CO2 SERPL-SCNC: 18.9 MMOL/L (ref 22–29)
COLOR UR: ABNORMAL
CREAT SERPL-MCNC: 0.78 MG/DL (ref 0.57–1)
DEPRECATED RDW RBC AUTO: 50.6 FL (ref 37–54)
EOSINOPHIL # BLD AUTO: 0.01 10*3/MM3 (ref 0–0.4)
EOSINOPHIL NFR BLD AUTO: 0.1 % (ref 0.3–6.2)
ERYTHROCYTE [DISTWIDTH] IN BLOOD BY AUTOMATED COUNT: 14.6 % (ref 12.3–15.4)
FLUAV RNA RESP QL NAA+PROBE: NOT DETECTED
FLUBV RNA RESP QL NAA+PROBE: NOT DETECTED
GFR SERPL CREATININE-BSD FRML MDRD: 71 ML/MIN/1.73
GLOBULIN UR ELPH-MCNC: 4.6 GM/DL
GLUCOSE SERPL-MCNC: 208 MG/DL (ref 65–99)
GLUCOSE UR STRIP-MCNC: NEGATIVE MG/DL
HCT VFR BLD AUTO: 46.7 % (ref 34–46.6)
HGB BLD-MCNC: 15.4 G/DL (ref 12–15.9)
HGB UR QL STRIP.AUTO: ABNORMAL
HYALINE CASTS UR QL AUTO: ABNORMAL /LPF
IMM GRANULOCYTES # BLD AUTO: 0.1 10*3/MM3 (ref 0–0.05)
IMM GRANULOCYTES NFR BLD AUTO: 0.7 % (ref 0–0.5)
KETONES UR QL STRIP: NEGATIVE
LEUKOCYTE ESTERASE UR QL STRIP.AUTO: ABNORMAL
LYMPHOCYTES # BLD AUTO: 2.51 10*3/MM3 (ref 0.7–3.1)
LYMPHOCYTES NFR BLD AUTO: 16.7 % (ref 19.6–45.3)
MCH RBC QN AUTO: 31.4 PG (ref 26.6–33)
MCHC RBC AUTO-ENTMCNC: 33 G/DL (ref 31.5–35.7)
MCV RBC AUTO: 95.1 FL (ref 79–97)
MONOCYTES # BLD AUTO: 1.39 10*3/MM3 (ref 0.1–0.9)
MONOCYTES NFR BLD AUTO: 9.2 % (ref 5–12)
NEUTROPHILS NFR BLD AUTO: 11.01 10*3/MM3 (ref 1.7–7)
NEUTROPHILS NFR BLD AUTO: 73 % (ref 42.7–76)
NITRITE UR QL STRIP: NEGATIVE
NRBC BLD AUTO-RTO: 0 /100 WBC (ref 0–0.2)
PH UR STRIP.AUTO: 6.5 [PH] (ref 4.5–8)
PLATELET # BLD AUTO: 263 10*3/MM3 (ref 140–450)
PMV BLD AUTO: 12.1 FL (ref 6–12)
POTASSIUM SERPL-SCNC: 4.1 MMOL/L (ref 3.5–5.2)
PROCALCITONIN SERPL-MCNC: 0.06 NG/ML (ref 0–0.25)
PROT SERPL-MCNC: 8.4 G/DL (ref 6–8.5)
PROT UR QL STRIP: ABNORMAL
RBC # BLD AUTO: 4.91 10*6/MM3 (ref 3.77–5.28)
RBC # UR STRIP: ABNORMAL /HPF
REF LAB TEST METHOD: ABNORMAL
SARS-COV-2 RNA RESP QL NAA+PROBE: NOT DETECTED
SODIUM SERPL-SCNC: 129 MMOL/L (ref 136–145)
SP GR UR STRIP: 1.01 (ref 1–1.03)
SQUAMOUS #/AREA URNS HPF: ABNORMAL /HPF
TSH SERPL DL<=0.05 MIU/L-ACNC: 3.83 UIU/ML (ref 0.27–4.2)
UROBILINOGEN UR QL STRIP: ABNORMAL
WBC # UR STRIP: ABNORMAL /HPF
WBC NRBC COR # BLD: 15.07 10*3/MM3 (ref 3.4–10.8)

## 2022-02-27 PROCEDURE — 0 DIATRIZOATE MEGLUMINE & SODIUM PER 1 ML: Performed by: EMERGENCY MEDICINE

## 2022-02-27 PROCEDURE — 36415 COLL VENOUS BLD VENIPUNCTURE: CPT

## 2022-02-27 PROCEDURE — 74018 RADEX ABDOMEN 1 VIEW: CPT

## 2022-02-27 PROCEDURE — 25010000002 CEFTRIAXONE SODIUM-DEXTROSE 1-3.74 GM-%(50ML) RECONSTITUTED SOLUTION: Performed by: EMERGENCY MEDICINE

## 2022-02-27 PROCEDURE — 87186 SC STD MICRODIL/AGAR DIL: CPT | Performed by: EMERGENCY MEDICINE

## 2022-02-27 PROCEDURE — 80053 COMPREHEN METABOLIC PANEL: CPT | Performed by: EMERGENCY MEDICINE

## 2022-02-27 PROCEDURE — 84443 ASSAY THYROID STIM HORMONE: CPT | Performed by: EMERGENCY MEDICINE

## 2022-02-27 PROCEDURE — 87040 BLOOD CULTURE FOR BACTERIA: CPT | Performed by: EMERGENCY MEDICINE

## 2022-02-27 PROCEDURE — P9612 CATHETERIZE FOR URINE SPEC: HCPCS

## 2022-02-27 PROCEDURE — 99284 EMERGENCY DEPT VISIT MOD MDM: CPT

## 2022-02-27 PROCEDURE — 84145 PROCALCITONIN (PCT): CPT | Performed by: EMERGENCY MEDICINE

## 2022-02-27 PROCEDURE — 87086 URINE CULTURE/COLONY COUNT: CPT | Performed by: EMERGENCY MEDICINE

## 2022-02-27 PROCEDURE — 96365 THER/PROPH/DIAG IV INF INIT: CPT

## 2022-02-27 PROCEDURE — 96375 TX/PRO/DX INJ NEW DRUG ADDON: CPT

## 2022-02-27 PROCEDURE — 0 LEVETIRACETAM IN NACL 0.75% 1000 MG/100ML SOLUTION: Performed by: EMERGENCY MEDICINE

## 2022-02-27 PROCEDURE — 71045 X-RAY EXAM CHEST 1 VIEW: CPT

## 2022-02-27 PROCEDURE — 99283 EMERGENCY DEPT VISIT LOW MDM: CPT | Performed by: EMERGENCY MEDICINE

## 2022-02-27 PROCEDURE — 87636 SARSCOV2 & INF A&B AMP PRB: CPT | Performed by: EMERGENCY MEDICINE

## 2022-02-27 PROCEDURE — 81001 URINALYSIS AUTO W/SCOPE: CPT | Performed by: EMERGENCY MEDICINE

## 2022-02-27 PROCEDURE — 85025 COMPLETE CBC W/AUTO DIFF WBC: CPT | Performed by: EMERGENCY MEDICINE

## 2022-02-27 PROCEDURE — 96361 HYDRATE IV INFUSION ADD-ON: CPT

## 2022-02-27 RX ORDER — LEVETIRACETAM 100 MG/ML
SOLUTION ORAL
Qty: 600 ML | Refills: 0 | Status: SHIPPED | OUTPATIENT
Start: 2022-02-27

## 2022-02-27 RX ORDER — LEVETIRACETAM 10 MG/ML
1000 INJECTION INTRAVASCULAR ONCE
Status: COMPLETED | OUTPATIENT
Start: 2022-02-27 | End: 2022-02-27

## 2022-02-27 RX ORDER — HYDROCODONE BITARTRATE AND ACETAMINOPHEN 5; 325 MG/1; MG/1
1 TABLET ORAL EVERY 6 HOURS PRN
COMMUNITY

## 2022-02-27 RX ORDER — CEFTRIAXONE 1 G/50ML
1 INJECTION, SOLUTION INTRAVENOUS ONCE
Status: COMPLETED | OUTPATIENT
Start: 2022-02-27 | End: 2022-02-27

## 2022-02-27 RX ORDER — CEFUROXIME AXETIL 500 MG/1
TABLET ORAL
Qty: 14 TABLET | Refills: 0 | Status: SHIPPED | OUTPATIENT
Start: 2022-02-27

## 2022-02-27 RX ORDER — LABETALOL HYDROCHLORIDE 5 MG/ML
10 INJECTION, SOLUTION INTRAVENOUS ONCE
Status: COMPLETED | OUTPATIENT
Start: 2022-02-27 | End: 2022-02-27

## 2022-02-27 RX ADMIN — LEVETIRACETAM 1000 MG: 10 INJECTION, SOLUTION INTRAVENOUS at 16:45

## 2022-02-27 RX ADMIN — CEFTRIAXONE 1 G: 1 INJECTION, SOLUTION INTRAVENOUS at 16:43

## 2022-02-27 RX ADMIN — LABETALOL HYDROCHLORIDE 10 MG: 5 INJECTION INTRAVENOUS at 15:47

## 2022-02-27 RX ADMIN — DIATRIZOATE MEGLUMINE AND DIATRIZOATE SODIUM 30 ML: 600; 100 SOLUTION ORAL; RECTAL at 17:30

## 2022-03-01 LAB — BACTERIA SPEC AEROBE CULT: ABNORMAL

## 2022-03-04 LAB
BACTERIA SPEC AEROBE CULT: NORMAL
BACTERIA SPEC AEROBE CULT: NORMAL

## 2022-03-12 ENCOUNTER — HOSPITAL ENCOUNTER (EMERGENCY)
Facility: HOSPITAL | Age: 82
Discharge: NURSING, REHAB OR LONG-TERM CARE | End: 2022-03-12
Attending: EMERGENCY MEDICINE | Admitting: EMERGENCY MEDICINE

## 2022-03-12 ENCOUNTER — APPOINTMENT (OUTPATIENT)
Dept: CT IMAGING | Facility: HOSPITAL | Age: 82
End: 2022-03-12

## 2022-03-12 VITALS
RESPIRATION RATE: 18 BRPM | DIASTOLIC BLOOD PRESSURE: 73 MMHG | BODY MASS INDEX: 23.93 KG/M2 | TEMPERATURE: 98.3 F | HEIGHT: 67 IN | OXYGEN SATURATION: 96 % | SYSTOLIC BLOOD PRESSURE: 163 MMHG | HEART RATE: 75 BPM | WEIGHT: 152.5 LBS

## 2022-03-12 DIAGNOSIS — R10.9 ABDOMINAL PAIN, UNSPECIFIED ABDOMINAL LOCATION: Primary | ICD-10-CM

## 2022-03-12 LAB
ALBUMIN SERPL-MCNC: 3.3 G/DL (ref 3.5–5.2)
ALBUMIN/GLOB SERPL: 0.8 G/DL
ALP SERPL-CCNC: 161 U/L (ref 39–117)
ALT SERPL W P-5'-P-CCNC: 17 U/L (ref 1–33)
AMORPH URATE CRY URNS QL MICRO: ABNORMAL /HPF
ANION GAP SERPL CALCULATED.3IONS-SCNC: 9 MMOL/L (ref 5–15)
AST SERPL-CCNC: 21 U/L (ref 1–32)
BACTERIA UR QL AUTO: ABNORMAL /HPF
BASOPHILS # BLD AUTO: 0.08 10*3/MM3 (ref 0–0.2)
BASOPHILS NFR BLD AUTO: 0.8 % (ref 0–1.5)
BILIRUB SERPL-MCNC: 0.3 MG/DL (ref 0–1.2)
BILIRUB UR QL STRIP: NEGATIVE
BUN SERPL-MCNC: 15 MG/DL (ref 8–23)
BUN/CREAT SERPL: 24.2 (ref 7–25)
CALCIUM SPEC-SCNC: 9.1 MG/DL (ref 8.6–10.5)
CHLORIDE SERPL-SCNC: 93 MMOL/L (ref 98–107)
CLARITY UR: CLEAR
CO2 SERPL-SCNC: 29 MMOL/L (ref 22–29)
COLOR UR: YELLOW
CREAT SERPL-MCNC: 0.62 MG/DL (ref 0.57–1)
DEPRECATED RDW RBC AUTO: 53.2 FL (ref 37–54)
EGFRCR SERPLBLD CKD-EPI 2021: 88.5 ML/MIN/1.73
EOSINOPHIL # BLD AUTO: 0.18 10*3/MM3 (ref 0–0.4)
EOSINOPHIL NFR BLD AUTO: 1.8 % (ref 0.3–6.2)
ERYTHROCYTE [DISTWIDTH] IN BLOOD BY AUTOMATED COUNT: 14.7 % (ref 12.3–15.4)
GLOBULIN UR ELPH-MCNC: 4.1 GM/DL
GLUCOSE SERPL-MCNC: 152 MG/DL (ref 65–99)
GLUCOSE UR STRIP-MCNC: NEGATIVE MG/DL
HCT VFR BLD AUTO: 43.2 % (ref 34–46.6)
HGB BLD-MCNC: 14 G/DL (ref 12–15.9)
HGB UR QL STRIP.AUTO: ABNORMAL
HYALINE CASTS UR QL AUTO: ABNORMAL /LPF
IMM GRANULOCYTES # BLD AUTO: 0.12 10*3/MM3 (ref 0–0.05)
IMM GRANULOCYTES NFR BLD AUTO: 1.2 % (ref 0–0.5)
KETONES UR QL STRIP: NEGATIVE
LEUKOCYTE ESTERASE UR QL STRIP.AUTO: NEGATIVE
LIPASE SERPL-CCNC: 27 U/L (ref 13–60)
LYMPHOCYTES # BLD AUTO: 1.58 10*3/MM3 (ref 0.7–3.1)
LYMPHOCYTES NFR BLD AUTO: 15.4 % (ref 19.6–45.3)
MCH RBC QN AUTO: 31.7 PG (ref 26.6–33)
MCHC RBC AUTO-ENTMCNC: 32.4 G/DL (ref 31.5–35.7)
MCV RBC AUTO: 98 FL (ref 79–97)
MONOCYTES # BLD AUTO: 1.34 10*3/MM3 (ref 0.1–0.9)
MONOCYTES NFR BLD AUTO: 13.1 % (ref 5–12)
MUCOUS THREADS URNS QL MICRO: ABNORMAL /HPF
NEUTROPHILS NFR BLD AUTO: 6.94 10*3/MM3 (ref 1.7–7)
NEUTROPHILS NFR BLD AUTO: 67.7 % (ref 42.7–76)
NITRITE UR QL STRIP: NEGATIVE
NRBC BLD AUTO-RTO: 0 /100 WBC (ref 0–0.2)
PH UR STRIP.AUTO: 7 [PH] (ref 4.5–8)
PLATELET # BLD AUTO: 299 10*3/MM3 (ref 140–450)
PMV BLD AUTO: 9.7 FL (ref 6–12)
POTASSIUM SERPL-SCNC: 4.5 MMOL/L (ref 3.5–5.2)
PROT SERPL-MCNC: 7.4 G/DL (ref 6–8.5)
PROT UR QL STRIP: NEGATIVE
RBC # BLD AUTO: 4.41 10*6/MM3 (ref 3.77–5.28)
RBC # UR STRIP: ABNORMAL /HPF
REF LAB TEST METHOD: ABNORMAL
SODIUM SERPL-SCNC: 131 MMOL/L (ref 136–145)
SP GR UR STRIP: 1.02 (ref 1–1.03)
SQUAMOUS #/AREA URNS HPF: ABNORMAL /HPF
UROBILINOGEN UR QL STRIP: ABNORMAL
WBC # UR STRIP: ABNORMAL /HPF
WBC NRBC COR # BLD: 10.24 10*3/MM3 (ref 3.4–10.8)

## 2022-03-12 PROCEDURE — 83690 ASSAY OF LIPASE: CPT | Performed by: EMERGENCY MEDICINE

## 2022-03-12 PROCEDURE — 25010000002 MORPHINE PER 10 MG: Performed by: EMERGENCY MEDICINE

## 2022-03-12 PROCEDURE — 99283 EMERGENCY DEPT VISIT LOW MDM: CPT

## 2022-03-12 PROCEDURE — 85025 COMPLETE CBC W/AUTO DIFF WBC: CPT | Performed by: EMERGENCY MEDICINE

## 2022-03-12 PROCEDURE — P9612 CATHETERIZE FOR URINE SPEC: HCPCS

## 2022-03-12 PROCEDURE — 74177 CT ABD & PELVIS W/CONTRAST: CPT

## 2022-03-12 PROCEDURE — 87086 URINE CULTURE/COLONY COUNT: CPT | Performed by: EMERGENCY MEDICINE

## 2022-03-12 PROCEDURE — 36415 COLL VENOUS BLD VENIPUNCTURE: CPT

## 2022-03-12 PROCEDURE — 81001 URINALYSIS AUTO W/SCOPE: CPT | Performed by: EMERGENCY MEDICINE

## 2022-03-12 PROCEDURE — 80053 COMPREHEN METABOLIC PANEL: CPT | Performed by: EMERGENCY MEDICINE

## 2022-03-12 PROCEDURE — 0 IOPAMIDOL PER 1 ML: Performed by: EMERGENCY MEDICINE

## 2022-03-12 PROCEDURE — 96374 THER/PROPH/DIAG INJ IV PUSH: CPT

## 2022-03-12 PROCEDURE — 99282 EMERGENCY DEPT VISIT SF MDM: CPT | Performed by: EMERGENCY MEDICINE

## 2022-03-12 RX ADMIN — MORPHINE SULFATE 4 MG: 4 INJECTION INTRAVENOUS at 07:30

## 2022-03-12 RX ADMIN — IOPAMIDOL 100 ML: 755 INJECTION, SOLUTION INTRAVENOUS at 06:41

## 2022-03-12 NOTE — ED NOTES
[facility] notified of patients readiness for transfer back to [facility].  They will contact [facility] to request them to tx patient.

## 2022-03-12 NOTE — ED PROVIDER NOTES
Subjective   81-year-old female sent from nursing home after nursing home staff became concerned with patient's abdominal exam.  Patient is nonverbal and provides no history.  She is G-tube dependent.  Per nursing home staff patient grimaced in pain when I press on her abdomen tonight.  They did not report any vomiting or diarrhea and reported the patient had a normal bowel movement earlier this evening.  No reported fevers or chills.  They report patient to be at her mental status baseline.          Review of Systems   Unable to perform ROS: Patient nonverbal       Past Medical History:   Diagnosis Date   • Brain damage     Anoxic   • Breast cancer (HCC) 12/2015    Stage II, [id number], invasive ductal, right, ER/WA positive, HER-2/denia negative.   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       Allergies   Allergen Reactions   • Lactulose    • Megestrol    • Nitrofurantoin        Past Surgical History:   Procedure Laterality Date   • BREAST BIOPSY     • MASTECTOMY Right 2015    Modified radical w/axilary dissection.   • PEG TUBE INSERTION N/A 3/24/2021    Procedure: PERCUTANEOUS ENDOSCOPIC GASTROSTOMY TUBE INSERTION;  Surgeon: Dave Howard MD;  Location: [facility];  Service: Gastroenterology;  Laterality: N/A;  Duodenal diverticulum  Gastritis  Hiatal hernia  Esophagitis  Esophageal stricture       Family History   Problem Relation Age of Onset   • Breast cancer Neg Hx        Social History     Socioeconomic History   • Marital status:    Tobacco Use   • Smoking status: Never Smoker   • Smokeless tobacco: Never Used   Substance and Sexual Activity   • Alcohol use: No   • Drug use: No           Objective   Physical Exam  Constitutional:       General: She is not in  acute distress.     Appearance: She is not toxic-appearing.   HENT:      Head: Normocephalic and atraumatic.      Mouth/Throat:      Mouth: Mucous membranes are moist.      Pharynx: Oropharynx is clear.   Eyes:      Extraocular Movements: Extraocular movements intact.      Pupils: Pupils are equal, round, and reactive to light.   Cardiovascular:      Rate and Rhythm: Normal rate and regular rhythm.      Pulses: Normal pulses.      Heart sounds: Normal heart sounds.   Pulmonary:      Effort: Pulmonary effort is normal. No respiratory distress.      Breath sounds: Normal breath sounds.   Abdominal:      Palpations: Abdomen is soft.      Tenderness: There is no abdominal tenderness. There is no guarding or rebound.      Comments: Mildly distended   Musculoskeletal:         General: No tenderness or signs of injury.   Skin:     General: Skin is warm and dry.   Neurological:      Mental Status: Mental status is at baseline.         Procedures           ED Course  ED Course as of 03/12/22 0714   Sat Mar 12, 2022   0509 Patient with elevated blood pressure, otherwise stable vital signs.  Her abdomen on my exam is mildly tender but she does not appear to be particularly tender, does not really respond 1 where the other when I do push on her abdomen.  Will obtain labs, urine. [TD]   0712 White blood cell count normal, patient does have some RBCs WBCs in urine but no bacteria.  CT of abdomen shows no acute pathology.  For me patient's abdominal exam is been soft, nontender if not may be mildly distended.  Overall I feel patient is stable for discharge.  Patient was given some pain medication here as she appears to have chronic narcotic pain medication scheduled at nursing home. [TD]      ED Course User Index  [TD] Chandra Meléndez MD                                                 [facility]    Final diagnoses:   Abdominal pain, unspecified abdominal location       ED Disposition  ED Disposition     ED Disposition   Discharge     Condition   Stable    Comment   --             Josefa Mcknight DO  [id number] [address]  [phone]    In 2 days           Medication List      No changes were made to your prescriptions during this visit.          Chandra Meléndez MD  03/12/22 0714

## 2022-03-13 LAB — BACTERIA SPEC AEROBE CULT: NO GROWTH

## (undated) DEVICE — SPNG GZ WOVN 4X4IN 12PLY 10/BX STRL

## (undated) DEVICE — SYR LL 3CC

## (undated) DEVICE — NDL HYPO ECLPS SFTY 25G 1 1/2IN

## (undated) DEVICE — JACKT LAB F/R KNIT CUFF/COLR XLG BLU

## (undated) DEVICE — SYR LUERLOK 5CC

## (undated) DEVICE — GLV SURG SENSICARE PI MIC PF SZ7.5 LF STRL

## (undated) DEVICE — TBG FEED PULL FLOW20 NO/DRUG 20F 4.47MM 150CM

## (undated) DEVICE — LIMB HOLDER, WRIST/ANKLE: Brand: DEROYAL

## (undated) DEVICE — BNDR ABD 4PANEL12IN 30TO45IN

## (undated) DEVICE — TBG FEED PEG PULL MIC SFTY 20F

## (undated) DEVICE — THE BITE BLOCK MAXI, LATEX FREE STRAP IS USED TO PROTECT THE ENDOSCOPE INSERTION TUBE FROM BEING BITTEN BY THE PATIENT.

## (undated) DEVICE — KT ORCA ORCAPOD DISP STRL

## (undated) DEVICE — BW-412T DISP COMBO CLEANING BRUSH: Brand: SINGLE USE COMBINATION CLEANING BRUSH

## (undated) DEVICE — Device

## (undated) DEVICE — SUCTION CANISTER, 3000CC,SAFELINER: Brand: DEROYAL

## (undated) DEVICE — PERCUTANEOUS ENDOSCOPIC GASTROSTOMY KIT: Brand: ENDOVIVE SAFETY PEG KIT